# Patient Record
Sex: FEMALE | Race: WHITE | NOT HISPANIC OR LATINO
[De-identification: names, ages, dates, MRNs, and addresses within clinical notes are randomized per-mention and may not be internally consistent; named-entity substitution may affect disease eponyms.]

---

## 2017-01-13 ENCOUNTER — APPOINTMENT (OUTPATIENT)
Dept: NEUROSURGERY | Facility: CLINIC | Age: 37
End: 2017-01-13

## 2017-01-13 VITALS
OXYGEN SATURATION: 96 % | HEIGHT: 68 IN | WEIGHT: 117 LBS | SYSTOLIC BLOOD PRESSURE: 93 MMHG | HEART RATE: 83 BPM | BODY MASS INDEX: 17.73 KG/M2 | DIASTOLIC BLOOD PRESSURE: 60 MMHG

## 2017-01-26 ENCOUNTER — APPOINTMENT (OUTPATIENT)
Dept: NEUROSURGERY | Facility: CLINIC | Age: 37
End: 2017-01-26

## 2017-02-03 ENCOUNTER — APPOINTMENT (OUTPATIENT)
Dept: NEUROSURGERY | Facility: CLINIC | Age: 37
End: 2017-02-03

## 2017-02-03 VITALS
DIASTOLIC BLOOD PRESSURE: 65 MMHG | HEIGHT: 68 IN | HEART RATE: 57 BPM | OXYGEN SATURATION: 98 % | WEIGHT: 119 LBS | BODY MASS INDEX: 18.04 KG/M2 | SYSTOLIC BLOOD PRESSURE: 99 MMHG

## 2017-02-23 ENCOUNTER — FORM ENCOUNTER (OUTPATIENT)
Age: 37
End: 2017-02-23

## 2017-02-24 ENCOUNTER — OUTPATIENT (OUTPATIENT)
Dept: OUTPATIENT SERVICES | Facility: HOSPITAL | Age: 37
LOS: 1 days | End: 2017-02-24
Payer: COMMERCIAL

## 2017-02-24 ENCOUNTER — APPOINTMENT (OUTPATIENT)
Dept: OTOLARYNGOLOGY | Facility: CLINIC | Age: 37
End: 2017-02-24

## 2017-02-24 DIAGNOSIS — F50.9 EATING DISORDER, UNSPECIFIED: ICD-10-CM

## 2017-02-24 PROCEDURE — 70450 CT HEAD/BRAIN W/O DYE: CPT | Mod: 26

## 2017-02-24 PROCEDURE — 70450 CT HEAD/BRAIN W/O DYE: CPT

## 2017-02-25 PROBLEM — F50.9 EATING DISORDER IN REMISSION: Status: RESOLVED | Noted: 2017-02-25 | Resolved: 2017-02-25

## 2017-02-28 ENCOUNTER — APPOINTMENT (OUTPATIENT)
Dept: NEUROSURGERY | Facility: HOSPITAL | Age: 37
End: 2017-02-28

## 2017-04-03 ENCOUNTER — APPOINTMENT (OUTPATIENT)
Dept: OTOLARYNGOLOGY | Facility: CLINIC | Age: 37
End: 2017-04-03

## 2017-04-03 VITALS
HEART RATE: 79 BPM | HEIGHT: 68 IN | SYSTOLIC BLOOD PRESSURE: 97 MMHG | BODY MASS INDEX: 18.04 KG/M2 | DIASTOLIC BLOOD PRESSURE: 62 MMHG | WEIGHT: 119 LBS | TEMPERATURE: 97.4 F

## 2017-04-04 RX ORDER — CHLORHEXIDINE GLUCONATE 213 G/1000ML
1 SOLUTION TOPICAL ONCE
Qty: 0 | Refills: 0 | Status: COMPLETED | OUTPATIENT
Start: 2017-04-05 | End: 2017-04-06

## 2017-04-05 ENCOUNTER — INPATIENT (INPATIENT)
Facility: HOSPITAL | Age: 37
LOS: 4 days | Discharge: HOME HEALTH | DRG: 27 | End: 2017-04-10
Attending: RADIOLOGY | Admitting: RADIOLOGY
Payer: COMMERCIAL

## 2017-04-05 ENCOUNTER — RESULT REVIEW (OUTPATIENT)
Age: 37
End: 2017-04-05

## 2017-04-05 VITALS
SYSTOLIC BLOOD PRESSURE: 85 MMHG | OXYGEN SATURATION: 99 % | HEART RATE: 68 BPM | RESPIRATION RATE: 16 BRPM | DIASTOLIC BLOOD PRESSURE: 58 MMHG | TEMPERATURE: 99 F

## 2017-04-05 DIAGNOSIS — Z98.891 HISTORY OF UTERINE SCAR FROM PREVIOUS SURGERY: Chronic | ICD-10-CM

## 2017-04-05 DIAGNOSIS — D32.9 BENIGN NEOPLASM OF MENINGES, UNSPECIFIED: ICD-10-CM

## 2017-04-05 LAB
ALBUMIN SERPL ELPH-MCNC: 3.8 G/DL — SIGNIFICANT CHANGE UP (ref 3.4–5)
ALP SERPL-CCNC: 68 U/L — SIGNIFICANT CHANGE UP (ref 40–120)
ALT FLD-CCNC: 17 U/L — SIGNIFICANT CHANGE UP (ref 12–42)
ANION GAP SERPL CALC-SCNC: 5 MMOL/L — LOW (ref 9–16)
APTT BLD: 32.8 SEC — SIGNIFICANT CHANGE UP (ref 27.5–37.4)
AST SERPL-CCNC: 15 U/L — SIGNIFICANT CHANGE UP (ref 15–37)
BASOPHILS NFR BLD AUTO: 0.6 % — SIGNIFICANT CHANGE UP (ref 0–2)
BILIRUB SERPL-MCNC: 0.6 MG/DL — SIGNIFICANT CHANGE UP (ref 0.2–1.2)
BUN SERPL-MCNC: 9 MG/DL — SIGNIFICANT CHANGE UP (ref 7–23)
CALCIUM SERPL-MCNC: 9.1 MG/DL — SIGNIFICANT CHANGE UP (ref 8.5–10.5)
CHLORIDE SERPL-SCNC: 106 MMOL/L — SIGNIFICANT CHANGE UP (ref 96–108)
CO2 SERPL-SCNC: 28 MMOL/L — SIGNIFICANT CHANGE UP (ref 22–31)
CREAT SERPL-MCNC: 0.75 MG/DL — SIGNIFICANT CHANGE UP (ref 0.5–1.3)
EOSINOPHIL NFR BLD AUTO: 2.1 % — SIGNIFICANT CHANGE UP (ref 0–6)
GLUCOSE SERPL-MCNC: 85 MG/DL — SIGNIFICANT CHANGE UP (ref 70–99)
HCG SERPL-ACNC: <1 MIU/ML — SIGNIFICANT CHANGE UP
HCT VFR BLD CALC: 36.9 % — SIGNIFICANT CHANGE UP (ref 34.5–45)
HGB BLD-MCNC: 12.5 G/DL — SIGNIFICANT CHANGE UP (ref 11.5–15.5)
INR BLD: 1.09 — SIGNIFICANT CHANGE UP (ref 0.88–1.16)
LYMPHOCYTES # BLD AUTO: 39.5 % — SIGNIFICANT CHANGE UP (ref 13–44)
MCHC RBC-ENTMCNC: 28.3 PG — SIGNIFICANT CHANGE UP (ref 27–34)
MCHC RBC-ENTMCNC: 33.9 G/DL — SIGNIFICANT CHANGE UP (ref 32–36)
MCV RBC AUTO: 83.5 FL — SIGNIFICANT CHANGE UP (ref 80–100)
MONOCYTES NFR BLD AUTO: 10.5 % — SIGNIFICANT CHANGE UP (ref 2–14)
NEUTROPHILS NFR BLD AUTO: 47.3 % — SIGNIFICANT CHANGE UP (ref 43–77)
PLATELET # BLD AUTO: 152 K/UL — SIGNIFICANT CHANGE UP (ref 150–400)
POTASSIUM SERPL-MCNC: 4.2 MMOL/L — SIGNIFICANT CHANGE UP (ref 3.5–5.3)
POTASSIUM SERPL-SCNC: 4.2 MMOL/L — SIGNIFICANT CHANGE UP (ref 3.5–5.3)
PROT SERPL-MCNC: 7.2 G/DL — SIGNIFICANT CHANGE UP (ref 6.4–8.2)
PROTHROM AB SERPL-ACNC: 12.1 SEC — SIGNIFICANT CHANGE UP (ref 9.8–12.7)
RBC # BLD: 4.42 M/UL — SIGNIFICANT CHANGE UP (ref 3.8–5.2)
RBC # FLD: 13.9 % — SIGNIFICANT CHANGE UP (ref 10.3–16.9)
SODIUM SERPL-SCNC: 139 MMOL/L — SIGNIFICANT CHANGE UP (ref 135–145)
WBC # BLD: 4.8 K/UL — SIGNIFICANT CHANGE UP (ref 3.8–10.5)
WBC # FLD AUTO: 4.8 K/UL — SIGNIFICANT CHANGE UP (ref 3.8–10.5)

## 2017-04-05 PROCEDURE — 75898 FOLLOW-UP ANGIOGRAPHY: CPT | Mod: 26,59

## 2017-04-05 PROCEDURE — 93010 ELECTROCARDIOGRAM REPORT: CPT

## 2017-04-05 PROCEDURE — 61624 TCAT PERM OCCLS/EMBOLJ CNS: CPT

## 2017-04-05 PROCEDURE — 36227 PLACE CATH XTRNL CAROTID: CPT | Mod: 50

## 2017-04-05 PROCEDURE — 75894 X-RAYS TRANSCATH THERAPY: CPT | Mod: 26

## 2017-04-05 PROCEDURE — 99291 CRITICAL CARE FIRST HOUR: CPT | Mod: 24

## 2017-04-05 PROCEDURE — 36224 PLACE CATH CAROTD ART: CPT | Mod: 50

## 2017-04-05 RX ORDER — DEXAMETHASONE 0.5 MG/5ML
2 ELIXIR ORAL EVERY 8 HOURS
Qty: 0 | Refills: 0 | Status: DISCONTINUED | OUTPATIENT
Start: 2017-04-05 | End: 2017-04-07

## 2017-04-05 RX ORDER — GLUCAGON INJECTION, SOLUTION 0.5 MG/.1ML
1 INJECTION, SOLUTION SUBCUTANEOUS ONCE
Qty: 0 | Refills: 0 | Status: DISCONTINUED | OUTPATIENT
Start: 2017-04-05 | End: 2017-04-10

## 2017-04-05 RX ORDER — SODIUM CHLORIDE 9 MG/ML
1000 INJECTION, SOLUTION INTRAVENOUS
Qty: 0 | Refills: 0 | Status: DISCONTINUED | OUTPATIENT
Start: 2017-04-05 | End: 2017-04-10

## 2017-04-05 RX ORDER — ACETAMINOPHEN 500 MG
650 TABLET ORAL EVERY 6 HOURS
Qty: 0 | Refills: 0 | Status: DISCONTINUED | OUTPATIENT
Start: 2017-04-05 | End: 2017-04-10

## 2017-04-05 RX ORDER — SODIUM CHLORIDE 9 MG/ML
1000 INJECTION INTRAMUSCULAR; INTRAVENOUS; SUBCUTANEOUS
Qty: 0 | Refills: 0 | Status: DISCONTINUED | OUTPATIENT
Start: 2017-04-05 | End: 2017-04-07

## 2017-04-05 RX ORDER — PANTOPRAZOLE SODIUM 20 MG/1
40 TABLET, DELAYED RELEASE ORAL
Qty: 0 | Refills: 0 | Status: DISCONTINUED | OUTPATIENT
Start: 2017-04-05 | End: 2017-04-10

## 2017-04-05 RX ORDER — INSULIN LISPRO 100/ML
VIAL (ML) SUBCUTANEOUS
Qty: 0 | Refills: 0 | Status: DISCONTINUED | OUTPATIENT
Start: 2017-04-05 | End: 2017-04-10

## 2017-04-05 RX ORDER — DEXTROSE 50 % IN WATER 50 %
25 SYRINGE (ML) INTRAVENOUS ONCE
Qty: 0 | Refills: 0 | Status: DISCONTINUED | OUTPATIENT
Start: 2017-04-05 | End: 2017-04-10

## 2017-04-05 RX ORDER — SODIUM CHLORIDE 9 MG/ML
1000 INJECTION INTRAMUSCULAR; INTRAVENOUS; SUBCUTANEOUS ONCE
Qty: 0 | Refills: 0 | Status: COMPLETED | OUTPATIENT
Start: 2017-04-05 | End: 2017-04-05

## 2017-04-05 RX ORDER — DEXTROSE 50 % IN WATER 50 %
1 SYRINGE (ML) INTRAVENOUS ONCE
Qty: 0 | Refills: 0 | Status: DISCONTINUED | OUTPATIENT
Start: 2017-04-05 | End: 2017-04-10

## 2017-04-05 RX ORDER — DEXTROSE 50 % IN WATER 50 %
12.5 SYRINGE (ML) INTRAVENOUS ONCE
Qty: 0 | Refills: 0 | Status: DISCONTINUED | OUTPATIENT
Start: 2017-04-05 | End: 2017-04-10

## 2017-04-05 RX ADMIN — Medication 101 MILLIGRAM(S): at 22:01

## 2017-04-05 RX ADMIN — Medication 101 MILLIGRAM(S): at 18:01

## 2017-04-05 RX ADMIN — SODIUM CHLORIDE 100 MILLILITER(S): 9 INJECTION INTRAMUSCULAR; INTRAVENOUS; SUBCUTANEOUS at 10:38

## 2017-04-05 RX ADMIN — SODIUM CHLORIDE 2000 MILLILITER(S): 9 INJECTION INTRAMUSCULAR; INTRAVENOUS; SUBCUTANEOUS at 11:00

## 2017-04-05 NOTE — H&P ADULT - PROBLEM SELECTOR PLAN 1
Cerebral angiogram, possible tumor embolization today,  Admission to SDU vs ICU pending intervention during angiogram,  Pre-op for OR tomorrow  D/w Dr. Summers

## 2017-04-05 NOTE — CONSULT NOTE ADULT - SUBJECTIVE AND OBJECTIVE BOX
35 yo F h/o meningioma s/p angiogram awaiting resection    Meds: none  Allergies: No Known Allergies    Vitals: Vital Signs Last 24 Hrs  T(C): 36.4, Max: 37.1 (04-05 @ 10:30)  T(F): 97.6, Max: 98.8 (04-05 @ 10:30)  HR: 67 (60 - 73)  BP: 81/55 (80/57 - 99/63)  BP(mean): --  RR: 16 (15 - 16)  SpO2: 98% (95% - 100%)  CAPILLARY BLOOD GLUCOSE  143 (05 Apr 2017 16:00)      Labs:                         12.5   4.8   )-----------( 152      ( 05 Apr 2017 07:12 )             36.9   04-05    139  |  106  |  9   ----------------------------<  85  4.2   |  28  |  0.75    Ca    9.1      05 Apr 2017 07:12    TPro  7.2  /  Alb  3.8  /  TBili  0.6  /  DBili  x   /  AST  15  /  ALT  17  /  AlkPhos  68  04-05  PT/INR - ( 05 Apr 2017 07:12 )   PT: 12.1 sec;   INR: 1.09          PTT - ( 05 Apr 2017 07:12 )  PTT:32.8 sec    Exam:  Neuro: A&Ox3, NAD, grossly neuro intact  CV: RRR, no MRGs  Pulm: CTAB, no wheezes, rales mele  Abd: BS (+), Soft, NT, ND  Ext: No edema peripherally or centrally  Vasc: 2+ pulses - radial and PT

## 2017-04-05 NOTE — H&P ADULT - NSHPLABSRESULTS_GEN_ALL_CORE
CT Head: Frontal intraosseous mass lesion with   adjacent extra-axial calcification and osteolytic changes.

## 2017-04-05 NOTE — CONSULT NOTE ADULT - ASSESSMENT
37 yo F h/o meningioma s/p angiogram awaiting resection    Neuro: Q1h neuro checks, tylenol prn, decadron 2q8h  CV: HD stable, normotensive goals  Pulm:Satting well on facemask  GI/FEN: reg diet, npo after MN, NS@100, protonix  : Voiding  ID: none  Endo: ISS  Heme: SCDs  PPX: PPI  Lines: PIVs, Shayy

## 2017-04-05 NOTE — H&P ADULT - NSHPPHYSICALEXAM_GEN_ALL_CORE
Gen: NAD, AAOx3. WN/WD.  HEENT: PERRL. EOMI. NC/AT. VF grossly intact.  Neck: FROM, nontender.  Lungs: CTA b/l. No W/R/R  Heart: S1, S2. NSR.  Abd: Soft, flat, NT/ND. +BS  Exts; Pulses 2+ throughout  Neuro: CNs II-XII intact. 5/5 str x4 extremities. Sensation to LT intact. Following commands. Gait WNL. No focal neurological deficits.

## 2017-04-05 NOTE — BRIEF OPERATIVE NOTE - PROCEDURE
Cerebral angiography  04/05/2017  Cerebral Angiography with embolization of right frontal extra-axial brain mass  [facility]

## 2017-04-05 NOTE — H&P ADULT - HISTORY OF PRESENT ILLNESS
36 y.o female without significant PMHx,was found to have a frontal tumor by MRI after patient noticed a bony protuberance in the forehead.  No neurological symptoms, no pain.  Patient presents for pre opertaive cerebral angiogram and possible endovascular embolization prior to next day surgical resection.

## 2017-04-05 NOTE — BRIEF OPERATIVE NOTE - OPERATION/FINDINGS
Cerebral angiogram via right groin with 4FR sheath, manually compressed for 20 minutes. Embolization performed via bilateral middle meningeal arteries Cerebral angiogram via right CFA with 4FR sheath. Findings consistent with moderately vascular bifrontal parasagittal meningioma, supplied via bilateral middle meningeal arteries, left anterior falcine artery, and left superficial temporal artery. 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 Endovascular embolization performed via bilateral middle meningeal arteries, and left superficial temporal artery using embospheres. Significant devascularization noted post. Patient tolerated procedure well, no new neurological deficits. Hemodynamically stable. Puncture site manually compressed with hemostasis obtained without hematoma. Patient transferred to PACU pending SICU bed availability. Above d/w Dr. Summers.

## 2017-04-05 NOTE — H&P ADULT - ASSESSMENT
36 year old female with no significant PMH with frontal intraosseous mass likely meningioma here for cerebral angiography and possible tumor embolization with planned surgical intervention to follow.

## 2017-04-06 ENCOUNTER — APPOINTMENT (OUTPATIENT)
Dept: NEUROSURGERY | Facility: HOSPITAL | Age: 37
End: 2017-04-06

## 2017-04-06 LAB
ANION GAP SERPL CALC-SCNC: 12 MMOL/L — SIGNIFICANT CHANGE UP (ref 9–16)
ANION GAP SERPL CALC-SCNC: 9 MMOL/L — SIGNIFICANT CHANGE UP (ref 9–16)
APTT BLD: 27.1 SEC — LOW (ref 27.5–37.4)
BLD GP AB SCN SERPL QL: NEGATIVE — SIGNIFICANT CHANGE UP
BUN SERPL-MCNC: 7 MG/DL — SIGNIFICANT CHANGE UP (ref 7–23)
BUN SERPL-MCNC: 7 MG/DL — SIGNIFICANT CHANGE UP (ref 7–23)
CALCIUM SERPL-MCNC: 7.9 MG/DL — LOW (ref 8.5–10.5)
CALCIUM SERPL-MCNC: 8.6 MG/DL — SIGNIFICANT CHANGE UP (ref 8.5–10.5)
CHLORIDE SERPL-SCNC: 110 MMOL/L — HIGH (ref 96–108)
CHLORIDE SERPL-SCNC: 113 MMOL/L — HIGH (ref 96–108)
CO2 SERPL-SCNC: 20 MMOL/L — LOW (ref 22–31)
CO2 SERPL-SCNC: 23 MMOL/L — SIGNIFICANT CHANGE UP (ref 22–31)
CREAT SERPL-MCNC: 0.62 MG/DL — SIGNIFICANT CHANGE UP (ref 0.5–1.3)
CREAT SERPL-MCNC: 0.62 MG/DL — SIGNIFICANT CHANGE UP (ref 0.5–1.3)
GLUCOSE SERPL-MCNC: 132 MG/DL — HIGH (ref 70–99)
GLUCOSE SERPL-MCNC: 170 MG/DL — HIGH (ref 70–99)
HBA1C BLD-MCNC: 5.3 % — SIGNIFICANT CHANGE UP (ref 4.8–5.6)
HCT VFR BLD CALC: 31.2 % — LOW (ref 34.5–45)
HCT VFR BLD CALC: 35.5 % — SIGNIFICANT CHANGE UP (ref 34.5–45)
HGB BLD-MCNC: 10.6 G/DL — LOW (ref 11.5–15.5)
HGB BLD-MCNC: 11.9 G/DL — SIGNIFICANT CHANGE UP (ref 11.5–15.5)
INR BLD: 1.19 — HIGH (ref 0.88–1.16)
MAGNESIUM SERPL-MCNC: 1.7 MG/DL — SIGNIFICANT CHANGE UP (ref 1.6–2.4)
MAGNESIUM SERPL-MCNC: 2.1 MG/DL — SIGNIFICANT CHANGE UP (ref 1.6–2.4)
MCHC RBC-ENTMCNC: 28.1 PG — SIGNIFICANT CHANGE UP (ref 27–34)
MCHC RBC-ENTMCNC: 28.6 PG — SIGNIFICANT CHANGE UP (ref 27–34)
MCHC RBC-ENTMCNC: 33.5 G/DL — SIGNIFICANT CHANGE UP (ref 32–36)
MCHC RBC-ENTMCNC: 34 G/DL — SIGNIFICANT CHANGE UP (ref 32–36)
MCV RBC AUTO: 83.7 FL — SIGNIFICANT CHANGE UP (ref 80–100)
MCV RBC AUTO: 84.1 FL — SIGNIFICANT CHANGE UP (ref 80–100)
PHOSPHATE SERPL-MCNC: 3.4 MG/DL — SIGNIFICANT CHANGE UP (ref 2.5–4.5)
PHOSPHATE SERPL-MCNC: 4 MG/DL — SIGNIFICANT CHANGE UP (ref 2.5–4.5)
PLATELET # BLD AUTO: 177 K/UL — SIGNIFICANT CHANGE UP (ref 150–400)
PLATELET # BLD AUTO: 192 K/UL — SIGNIFICANT CHANGE UP (ref 150–400)
POTASSIUM SERPL-MCNC: 3.7 MMOL/L — SIGNIFICANT CHANGE UP (ref 3.5–5.3)
POTASSIUM SERPL-MCNC: 3.8 MMOL/L — SIGNIFICANT CHANGE UP (ref 3.5–5.3)
POTASSIUM SERPL-SCNC: 3.7 MMOL/L — SIGNIFICANT CHANGE UP (ref 3.5–5.3)
POTASSIUM SERPL-SCNC: 3.8 MMOL/L — SIGNIFICANT CHANGE UP (ref 3.5–5.3)
PROTHROM AB SERPL-ACNC: 13.2 SEC — HIGH (ref 9.8–12.7)
RBC # BLD: 3.71 M/UL — LOW (ref 3.8–5.2)
RBC # BLD: 4.24 M/UL — SIGNIFICANT CHANGE UP (ref 3.8–5.2)
RBC # FLD: 13.8 % — SIGNIFICANT CHANGE UP (ref 10.3–16.9)
RBC # FLD: 14.1 % — SIGNIFICANT CHANGE UP (ref 10.3–16.9)
RH IG SCN BLD-IMP: POSITIVE — SIGNIFICANT CHANGE UP
SODIUM SERPL-SCNC: 142 MMOL/L — SIGNIFICANT CHANGE UP (ref 135–145)
SODIUM SERPL-SCNC: 145 MMOL/L — SIGNIFICANT CHANGE UP (ref 135–145)
WBC # BLD: 10.1 K/UL — SIGNIFICANT CHANGE UP (ref 3.8–10.5)
WBC # BLD: 17.5 K/UL — HIGH (ref 3.8–10.5)
WBC # FLD AUTO: 10.1 K/UL — SIGNIFICANT CHANGE UP (ref 3.8–10.5)
WBC # FLD AUTO: 17.5 K/UL — HIGH (ref 3.8–10.5)

## 2017-04-06 PROCEDURE — 93010 ELECTROCARDIOGRAM REPORT: CPT

## 2017-04-06 PROCEDURE — 99291 CRITICAL CARE FIRST HOUR: CPT | Mod: 24

## 2017-04-06 PROCEDURE — 93970 EXTREMITY STUDY: CPT | Mod: 26

## 2017-04-06 PROCEDURE — 21179 RCNSTJ FOREHEAD WITH GRAFTS: CPT

## 2017-04-06 PROCEDURE — 62140 CRNOP SKULL DEFECT<5 CM DIAM: CPT | Mod: 62,59

## 2017-04-06 PROCEDURE — 61781 SCAN PROC CRANIAL INTRA: CPT

## 2017-04-06 PROCEDURE — 15275 SKIN SUB GRAFT FACE/NK/HF/G: CPT

## 2017-04-06 PROCEDURE — 61512 CRNEC TREPH EXC MNGIOMA STTL: CPT

## 2017-04-06 RX ORDER — ONDANSETRON 8 MG/1
4 TABLET, FILM COATED ORAL ONCE
Qty: 0 | Refills: 0 | Status: COMPLETED | OUTPATIENT
Start: 2017-04-06 | End: 2017-04-06

## 2017-04-06 RX ORDER — LEVETIRACETAM 250 MG/1
500 TABLET, FILM COATED ORAL
Qty: 0 | Refills: 0 | Status: DISCONTINUED | OUTPATIENT
Start: 2017-04-06 | End: 2017-04-10

## 2017-04-06 RX ORDER — CEFAZOLIN SODIUM 1 G
1000 VIAL (EA) INJECTION EVERY 8 HOURS
Qty: 0 | Refills: 0 | Status: COMPLETED | OUTPATIENT
Start: 2017-04-06 | End: 2017-04-07

## 2017-04-06 RX ORDER — ACETAMINOPHEN 500 MG
1000 TABLET ORAL ONCE
Qty: 0 | Refills: 0 | Status: DISCONTINUED | OUTPATIENT
Start: 2017-04-06 | End: 2017-04-10

## 2017-04-06 RX ORDER — FENTANYL CITRATE 50 UG/ML
25 INJECTION INTRAVENOUS ONCE
Qty: 0 | Refills: 0 | Status: DISCONTINUED | OUTPATIENT
Start: 2017-04-06 | End: 2017-04-06

## 2017-04-06 RX ADMIN — CHLORHEXIDINE GLUCONATE 1 APPLICATION(S): 213 SOLUTION TOPICAL at 07:18

## 2017-04-06 RX ADMIN — Medication 101 MILLIGRAM(S): at 06:54

## 2017-04-06 RX ADMIN — Medication 2: at 22:16

## 2017-04-06 RX ADMIN — Medication 100 MILLIGRAM(S): at 22:08

## 2017-04-06 RX ADMIN — ONDANSETRON 4 MILLIGRAM(S): 8 TABLET, FILM COATED ORAL at 15:42

## 2017-04-06 RX ADMIN — FENTANYL CITRATE 25 MICROGRAM(S): 50 INJECTION INTRAVENOUS at 19:46

## 2017-04-06 RX ADMIN — Medication: at 16:00

## 2017-04-06 RX ADMIN — FENTANYL CITRATE 25 MICROGRAM(S): 50 INJECTION INTRAVENOUS at 15:42

## 2017-04-06 RX ADMIN — PANTOPRAZOLE SODIUM 40 MILLIGRAM(S): 20 TABLET, DELAYED RELEASE ORAL at 06:54

## 2017-04-06 RX ADMIN — Medication 101 MILLIGRAM(S): at 16:00

## 2017-04-06 RX ADMIN — SODIUM CHLORIDE 100 MILLILITER(S): 9 INJECTION INTRAMUSCULAR; INTRAVENOUS; SUBCUTANEOUS at 00:06

## 2017-04-06 RX ADMIN — Medication 101 MILLIGRAM(S): at 22:08

## 2017-04-06 RX ADMIN — LEVETIRACETAM 500 MILLIGRAM(S): 250 TABLET, FILM COATED ORAL at 19:50

## 2017-04-06 RX ADMIN — Medication: at 06:53

## 2017-04-06 NOTE — CHART NOTE - NSCHARTNOTEFT_GEN_A_CORE
Upon Nutritional Assessment by the Registered Dietitian your patient was determined to meet criteria / has evidence of the following diagnosis/diagnoses:          [ ]  Mild Protein Calorie Malnutrition        [ ]  Moderate Protein Calorie Malnutrition        [ ] Severe Protein Calorie Malnutrition        [ ] Unspecified Protein Calorie Malnutrition        [X ] Underweight / BMI <19        [ ] Morbid Obesity / BMI > 40      Findings as based on:  •  Comprehensive nutrition assessment and consultation  •  Calorie counts (nutrient intake analysis)  •  Food acceptance and intake status from observations by staff  •  Follow up  •  Patient education  •  Intervention secondary to interdisciplinary rounds  •   concerns      Treatment:    The following diet has been recommended:      PROVIDER Section:     By signing this assessment you are acknowledging and agree with the diagnosis/diagnoses assigned by the Registered Dietitian    Comments:

## 2017-04-06 NOTE — PROGRESS NOTE ADULT - SUBJECTIVE AND OBJECTIVE BOX
=================================  NEUROCRITICAL CARE ATTENDING NOTE  =================================    JOSE J SOLORIO   MRN-[id number]  Summary:  36F with no PMH, noted bony protuberance in forehead.  MRI revealed a frontal tumor.  Admitted on  - angio / embo, and elective surgery planned for today .  Overnight Events: Admitted to NSICU overnight.    PAST MEDICAL & SURGICAL HISTORY: Meningioma: Newly diagnosed H/O:  section  Home meds: none    PHYSICAL EXAMINATION  T(C): , Max: 37.1 ( @ 10:30) HR: 76 (58 - 94) BP: 90/54 (80/57 - 100/59) RR: 16 (13 - 21) SpO2: 97% (95% - 100%)  NEUROLOGIC EXAMINATION:  Patient is awake, alert, fully oriented, pupils 3mm equal and briskly reactive to light, EOMs intact, muscle strength 5/5 on all 4 extremities  GENERAL:  not intubated, not in cardiorespiratory distress  EENT: anicteric  CARDIOVASC:  (+) S1 S2, normal rate and regular rhythm  PULMONARY:  clear to auscultation bilaterally  ABDOMEN:  soft, nontender, with normoactive bowel sounds  EXTREMITIES:  no edema, no groin hematoma, strong distal pulses  SKIN:  no rash    LABS:  CAPILLARY BLOOD GLUCOSE 118 (2017 07:00) 143 (2017 16:00) 96 (2017 11:30)                        11.9   10.1  )-----------( 177      ( 2017 06:21 )             35.5     142  |  110<H>  |  7   ----------------------------<  132<H>  3.7   |  23  |  0.62    Ca    8.6      2017 06:21  Phos  4.0     -  Mg     2.1     -    TPro  7.2  /  Alb  3.8  /  TBili  0.6  /  DBili  x   /  AST  15  /  ALT  17  /  AlkPhos  68  -05    Neuroimagin17 CT head Frontal intraosseous mass lesion with adjacent extra-axial calcification and osteolytic changes  Other imaging:    MEDICATIONS: dexamethasone 2mg IV q8h pantoprazole 40 PO daily mod ISS     IV FLUIDS:  DRIPS:  DIET:  Lines / Drains:    CODE STATUS:  full code                       GOALS OF CARE:  aggressive                      DISPOSITION:  ICU =================================  NEUROCRITICAL CARE ATTENDING NOTE  =================================    JOSE J SOLORIO   [id number]  Summary:  36F with no PMH, noted bony protuberance in forehead.  MRI revealed a frontal tumor.  Admitted on  - angio / embo, and elective surgery planned for today .  Overnight Events: Admitted to NSICU overnight, no events overnight    PAST MEDICAL & SURGICAL HISTORY: Meningioma: Newly diagnosed H/O:  section  Home meds: none    PHYSICAL EXAMINATION  T(C): , Max: 37.1 ( @ 10:30) HR: 76 (58 - 94) BP: 90/54 (80/57 - 100/59) RR: 16 (13 - 21) SpO2: 97% (95% - 100%)  NEUROLOGIC EXAMINATION:  Patient is awake, alert, fully oriented, pupils 3mm equal and briskly reactive to light, EOMs intact, muscle strength 5/5 on all 4 extremities  GENERAL:  not intubated, not in cardiorespiratory distress  EENT: anicteric  CARDIOVASC:  (+) S1 S2, normal rate and regular rhythm  PULMONARY:  clear to auscultation bilaterally  ABDOMEN:  soft, nontender, with normoactive bowel sounds  EXTREMITIES:  no edema, no groin hematoma, strong distal pulses  SKIN:  no rash    LABS:  CAPILLARY BLOOD GLUCOSE 118 (2017 07:00) 143 (2017 16:00) 96 (2017 11:30)                        11.9   10.1  )-----------( 177      ( 2017 06:21 )             35.5     142  |  110<H>  |  7   ----------------------------<  132<H>  3.7   |  23  |  0.62    Ca    8.6      2017 06:21  Phos  4.0     04-  Mg     2.1     -    INR 1.19    TPro  7.2  /  Alb  3.8  /  TBili  0.6  /  DBili  x   /  AST  15  /  ALT  17  /  AlkPhos  68  -    Neuroimagin17 CT head Frontal intraosseous mass lesion with adjacent extra-axial calcification and osteolytic changes  Other imaging:    MEDICATIONS: dexamethasone 2mg IV q8h pantoprazole 40 PO daily mod ISS    IV FLUIDS: NS@100cc/hr  DRIPS:  DIET: NPO  Lines / Drains: [place], ?Lisa    CODE STATUS:  full code                       GOALS OF CARE:  aggressive                      DISPOSITION:  ICU

## 2017-04-06 NOTE — PROGRESS NOTE ADULT - SUBJECTIVE AND OBJECTIVE BOX
HPI:  36 y.o female without significant PMHx,was found to have a frontal tumor by MRI after patient noticed a bony protuberance in the forehead.  No neurological symptoms, no pain.  Patient presents for pre opertaive cerebral angiogram and possible endovascular embolization prior to next day surgical resection. (05 Apr 2017 08:18)    admitted to ICU post procedure yesterday; embolization for resection of menigioma today  no acute events overnight  patient in OR currently but denies any complaints this morning    OVERNIGHT EVENTS:  Vital Signs Last 24 Hrs  T(C): 36.8, Max: 37.1 (04-05 @ 10:30)  T(F): 98.2, Max: 98.8 (04-05 @ 10:30)  HR: 76 (58 - 94)  BP: 90/54 (80/57 - 100/59)  BP(mean): 68 (65 - 71)  RR: 16 (13 - 21)  SpO2: 97% (95% - 100%)    I&O's Detail    I & Os for current day (as of 06 Apr 2017 08:12)  =============================================  IN:    sodium chloride 0.9%.: 1600 ml    Sodium Chloride 0.9% IV Bolus: 1000 ml    Oral Fluid: 900 ml    IV PiggyBack: 50 ml    Total IN: 3550 ml  ---------------------------------------------  OUT:    Voided: 4900 ml    Total OUT: 4900 ml  ---------------------------------------------  Total NET: -1350 ml    I&O's Summary    I & Os for current day (as of 06 Apr 2017 08:12)  =============================================  IN: 3550 ml / OUT: 4900 ml / NET: -1350 ml      PHYSICAL EXAM:  Neurological:  A&O X 3, comfortable  EOMI, PERRL  face symmetric  neg drift  TARANGO x 4, motor 5/5 throughout  groin site c/d/i  distal pulses intact, w/w/p    TUBES/LINES:  pivs    DIET:  [x] NPO  [] Mechanical  [] Tube feeds    LABS:                        11.9   10.1  )-----------( 177      ( 06 Apr 2017 06:21 )             35.5     04-06    142  |  110<H>  |  7   ----------------------------<  132<H>  3.7   |  23  |  0.62    Ca    8.6      06 Apr 2017 06:21  Phos  4.0     04-06  Mg     2.1     04-06    TPro  7.2  /  Alb  3.8  /  TBili  0.6  /  DBili  x   /  AST  15  /  ALT  17  /  AlkPhos  68  04-05    PT/INR - ( 06 Apr 2017 06:21 )   PT: 13.2 sec;   INR: 1.19          PTT - ( 06 Apr 2017 06:21 )  PTT:27.1 sec        CAPILLARY BLOOD GLUCOSE  118 (06 Apr 2017 07:00)  143 (05 Apr 2017 16:00)  96 (05 Apr 2017 11:30)      Drug Levels: [] N/A    CSF Analysis: [] N/A      Allergies    No Known Allergies    Intolerances      MEDICATIONS:  Antibiotics:    Neuro:  acetaminophen   Tablet. 650milliGRAM(s) Oral every 6 hours PRN    Anticoagulation:    OTHER:  dexamethasone  IVPB 2milliGRAM(s) IV Intermittent every 8 hours  pantoprazole    Tablet 40milliGRAM(s) Oral before breakfast  insulin lispro (HumaLOG) corrective regimen sliding scale  SubCutaneous three times a day before meals  dextrose Gel 1Dose(s) Oral once PRN  dextrose 50% Injectable 12.5Gram(s) IV Push once  dextrose 50% Injectable 25Gram(s) IV Push once  dextrose 50% Injectable 25Gram(s) IV Push once  glucagon  Injectable 1milliGRAM(s) IntraMuscular once PRN    IVF:  sodium chloride 0.9%. 1000milliLiter(s) IV Continuous <Continuous>  dextrose 5%. 1000milliLiter(s) IV Continuous <Continuous>    CULTURES:    RADIOLOGY & ADDITIONAL TESTS:       ASSESSMENT:  36y Female s/p embolization procedure yesterday of brain mass currently in OR for craniotomy and resection of midline frontal meningioma preop intact    PLAN:  NEURO:  q1h neuro checks  pain control  keppra ppx  decadron per surgeon  HOB 30 deg    CARDIOVASCULAR:  monitor HD status closely  BP control    PULMONARY:  enc IS use post op    RENAL:  f/u post op labs  cont NS IVF hydration    GI:  NPO and will adat  PPI  bowel regimen    HEME:  trend h/h    ID:  post op abx    ENDO:  ISS while on decadron    DVT PROPHYLAXIS:  [x] Venodynes                                [] Heparin/Lovenox    DISPOSITION:   sicu status  full code  pt/ot pending  dw Dr. Yarbrough and Dr. Davis

## 2017-04-06 NOTE — PROGRESS NOTE ADULT - ATTENDING COMMENTS
PLAN:   NEURO: neurochecks q1h, PRN pain meds with Tylenol,  no opiates  Frontal ossous mass:  s/p angio embo - to OR for resection today, f/u pathology report  seizure prophylaxis:  levetiracetam 500mg IV BID x 5 days (stop date 04/10/17)  REHAB:  physical therapy evaluation and management    EARLY MOB:  HOB up    PULM:  Room air, incentive spirometry  CARDIO:  SBP goal 100-150mm Hg  ENDO:  Blood sugar goals 140-180 mg/dL, continue insulin sliding scale  GI:  docusate senna  DIET: NPO for OR  RENAL:  IVF while nPO NS@75cc/hr  HEM/ONC: check post-op Hb  VTE Prophylaxis:  SCDs, baseline dopplers for DVT suspected on admission (?malignancy), no DVT chemoprophylaxis as patient is going to OR today  ID: afebrile, no leukocytosis, perioperative ancef then d/c  Social:  at bedside, updated yesterday    Patient at high risk for neurological deterioration or death due to:  seizure, intracranial bleed, aspiration.  Critical care time, excluding procedures: 60 minutes. PLAN:   NEURO: neurochecks q1h, PRN pain meds with Tylenol,  no opiates  Frontal ossous mass:  s/p angio embo - to OR for resection today, f/u pathology report  ?indication for decadron - continue for now will inquire with neurosurgery  seizure prophylaxis:  levetiracetam 500mg IV BID x 5 days (stop date 04/10/17)  REHAB:  physical therapy evaluation and management    EARLY MOB:  HOB up    PULM:  Room air, incentive spirometry  CARDIO:  SBP goal 100-150mm Hg  ENDO:  Blood sugar goals 140-180 mg/dL, continue insulin sliding scale  GI:  docusate senna  DIET: NPO for OR  RENAL:  NS@100cc/hr  HEM/ONC: check post-op Hb  VTE Prophylaxis:  SCDs, baseline dopplers for DVT suspected on admission (?malignancy), no DVT chemoprophylaxis as patient is going to OR today  ID: afebrile, no leukocytosis, perioperative ancef then d/c  Social:  at bedside, updated yesterday    Patient at high risk for neurological deterioration or death due to:  seizure, intracranial bleed, aspiration.  Critical care time, excluding procedures: 60 minutes.

## 2017-04-06 NOTE — DIETITIAN INITIAL EVALUATION ADULT. - OTHER INFO
37y/o F with h/o meningioma s/p angiogram and for resection. NPO ordered and pt now off floor for the OR. Unable to obtain prior diet/wt history. Per BMI of 17.9, pt is underweight; see chart note in EMR. When pt is extubated, consult SLP for S&S prior to diet advancement. If pt to remains intubated >24hr or is not deemed safe for po, recommend EN initiation as medically feasible. Will follow.

## 2017-04-06 NOTE — PROGRESS NOTE ADULT - SUBJECTIVE AND OBJECTIVE BOX
HPI:  36 y.o female without significant PMHx,was found to have a frontal tumor by MRI after patient noticed a bony protuberance in the forehead.  No neurological symptoms, no pain.  Patient presents for pre operative cerebral angiogram and possible endovascular embolization prior to next day surgical resection. (05 Apr 2017 08:18)    admitted to ICU post op  reporting nausea and incisional pain  given zofran and fentanyl  denies any vomiting, blurry vision or neurologic complaints    OVERNIGHT EVENTS:  Vital Signs Last 24 Hrs  T(C): 35.6, Max: 37.1 (04-05 @ 20:15)  T(F): 96, Max: 98.7 (04-05 @ 20:15)  HR: 74 (58 - 94)  BP: 105/61 (81/55 - 105/61)  BP(mean): 75 (65 - 75)  RR: 22 (13 - 22)  SpO2: 100% (96% - 100%)    I&O's Summary    I & Os for current day (as of 06 Apr 2017 16:15)  =============================================  IN: 3550 ml / OUT: 4900 ml / NET: -1350 ml      PHYSICAL EXAM:  Neurological:  A&O X 3, mild incisional pain  JESSICA drain intact  EOMI, PERRL  neg drift  TARANGO x 4 motor 5/5 throughout  incision site c/d/i  sensation intact b/l    TUBES/LINES:  [x] Gonzalez  [] Lumbar Drain  [] Wound Drains  [] Others: JESSICA subgaleal      DIET:  [x] NPO  [] Mechanical  [] Tube feeds    LABS:                        11.9   10.1  )-----------( 177      ( 06 Apr 2017 06:21 )             35.5     04-06    142  |  110<H>  |  7   ----------------------------<  132<H>  3.7   |  23  |  0.62    Ca    8.6      06 Apr 2017 06:21  Phos  4.0     04-06  Mg     2.1     04-06    TPro  7.2  /  Alb  3.8  /  TBili  0.6  /  DBili  x   /  AST  15  /  ALT  17  /  AlkPhos  68  04-05    PT/INR - ( 06 Apr 2017 06:21 )   PT: 13.2 sec;   INR: 1.19          PTT - ( 06 Apr 2017 06:21 )  PTT:27.1 sec        CAPILLARY BLOOD GLUCOSE  118 (06 Apr 2017 07:00)      Drug Levels: [] N/A    CSF Analysis: [] N/A      Allergies    No Known Allergies    Intolerances      MEDICATIONS:  Antibiotics:  ceFAZolin   IVPB 1000milliGRAM(s) IV Intermittent every 8 hours    Neuro:  acetaminophen   Tablet. 650milliGRAM(s) Oral every 6 hours PRN  levETIRAcetam 500milliGRAM(s) Oral two times a day    Anticoagulation:    OTHER:  dexamethasone  IVPB 2milliGRAM(s) IV Intermittent every 8 hours  pantoprazole    Tablet 40milliGRAM(s) Oral before breakfast  insulin lispro (HumaLOG) corrective regimen sliding scale  SubCutaneous Before meals and at bedtime  dextrose Gel 1Dose(s) Oral once PRN  dextrose 50% Injectable 12.5Gram(s) IV Push once  dextrose 50% Injectable 25Gram(s) IV Push once  dextrose 50% Injectable 25Gram(s) IV Push once  glucagon  Injectable 1milliGRAM(s) IntraMuscular once PRN    IVF:  sodium chloride 0.9%. 1000milliLiter(s) IV Continuous <Continuous>  dextrose 5%. 1000milliLiter(s) IV Continuous <Continuous>    CULTURES:    RADIOLOGY & ADDITIONAL TESTS:      ASSESSMENT:  36y Female s/p craniectomy and resection of tumor with cranioplasty POD 0 neurologically intact    PLAN:  NEURO:  q1h neuro checks  pain control  hob 30 deg  MRI in 48 hrs  keppra ppx x 5 days  trend JESSICA output    CARDIOVASCULAR:  SBP < 180    PULMONARY:  wean NC  enc IS use    RENAL:  NS hydration while NPO    GI:  adat  zofran prn  ppi  bowel regimen    HEME:  trend h/h post op    ID:  post op ancef    ENDO:  ISS    DVT PROPHYLAXIS:  [x] Venodynes                                [] Heparin/Lovenox    DISPOSITION:  icu status  full code  dispo pending  d/w Dr. Yarbrough and Dr. Davis

## 2017-04-06 NOTE — PROGRESS NOTE ADULT - ASSESSMENT
36F with frontal intraosseous mass lesion, s/p angio and endovascular embolization (04/05/17, Dr. Summers)

## 2017-04-06 NOTE — PROGRESS NOTE ADULT - SUBJECTIVE AND OBJECTIVE BOX
Pre-Op note  Dx: skull tumor  Sx: craniectomy skull tumor resection  Surgeon: Dr. Yarbrough    Labs:                        12.5   4.8   )-----------( 152      ( 05 Apr 2017 07:12 )             36.9   04-05    139  |  106  |  9   ----------------------------<  85  4.2   |  28  |  0.75    Ca    9.1      05 Apr 2017 07:12    TPro  7.2  /  Alb  3.8  /  TBili  0.6  /  DBili  x   /  AST  15  /  ALT  17  /  AlkPhos  68  04-05  PT/INR - ( 05 Apr 2017 07:12 )   PT: 12.1 sec;   INR: 1.09          PTT - ( 05 Apr 2017 07:12 )  PTT:32.8 sec      - Medical clearance is in the chart  - Consent signed by the pt, in the front of the chart  - NPO, IVF  - T&S ordered, 2 u pRBC on hold for OR  - D/w Dr. Yarbrough

## 2017-04-06 NOTE — DIETITIAN INITIAL EVALUATION ADULT. - ENERGY NEEDS
IBW 63.6Kg  %IBW 84%  BMI 17.9    Utilized ABW to calculate needs, pt falls within % of IBW. Increased needs for pre-op and underweight.

## 2017-04-06 NOTE — BRIEF OPERATIVE NOTE - OPERATION/FINDINGS
PROCEDURE  Bifrontal craniectomy, resection of bifrontal extra-axial lesion with transosseous invasion, duraplasty (Alloderm) and cranioplasty (custom-made PEEK implant)    Calcified and partly fibrous extra-axial dural and SSS-based lesion, some areas of likely pial-cortical invasion in L medial frontal lobe, significant hyperostosis of cranium, no scalp invasion  Frozen section: probable meningioma  No complications

## 2017-04-06 NOTE — PRE-OP CHECKLIST - SELECT TESTS ORDERED
Results in MD note/Type and Cross/BMP/PT/PTT/CBC/INR/CMP Type and Cross/CBC/PT/PTT/hcg 4/5/17 negative/Results in MD note/CMP/INR/BMP

## 2017-04-07 LAB
ANION GAP SERPL CALC-SCNC: 7 MMOL/L — LOW (ref 9–16)
BUN SERPL-MCNC: 5 MG/DL — LOW (ref 7–23)
CALCIUM SERPL-MCNC: 7.8 MG/DL — LOW (ref 8.5–10.5)
CHLORIDE SERPL-SCNC: 108 MMOL/L — SIGNIFICANT CHANGE UP (ref 96–108)
CO2 SERPL-SCNC: 27 MMOL/L — SIGNIFICANT CHANGE UP (ref 22–31)
CREAT SERPL-MCNC: 0.64 MG/DL — SIGNIFICANT CHANGE UP (ref 0.5–1.3)
GLUCOSE SERPL-MCNC: 126 MG/DL — HIGH (ref 70–99)
HCT VFR BLD CALC: 30.1 % — LOW (ref 34.5–45)
HGB BLD-MCNC: 9.9 G/DL — LOW (ref 11.5–15.5)
MAGNESIUM SERPL-MCNC: 2 MG/DL — SIGNIFICANT CHANGE UP (ref 1.6–2.4)
MCHC RBC-ENTMCNC: 28 PG — SIGNIFICANT CHANGE UP (ref 27–34)
MCHC RBC-ENTMCNC: 32.9 G/DL — SIGNIFICANT CHANGE UP (ref 32–36)
MCV RBC AUTO: 85.3 FL — SIGNIFICANT CHANGE UP (ref 80–100)
PHOSPHATE SERPL-MCNC: 4.3 MG/DL — SIGNIFICANT CHANGE UP (ref 2.5–4.5)
PLATELET # BLD AUTO: 177 K/UL — SIGNIFICANT CHANGE UP (ref 150–400)
POTASSIUM SERPL-MCNC: 3.9 MMOL/L — SIGNIFICANT CHANGE UP (ref 3.5–5.3)
POTASSIUM SERPL-SCNC: 3.9 MMOL/L — SIGNIFICANT CHANGE UP (ref 3.5–5.3)
RBC # BLD: 3.53 M/UL — LOW (ref 3.8–5.2)
RBC # FLD: 14.3 % — SIGNIFICANT CHANGE UP (ref 10.3–16.9)
SODIUM SERPL-SCNC: 142 MMOL/L — SIGNIFICANT CHANGE UP (ref 135–145)
WBC # BLD: 9.7 K/UL — SIGNIFICANT CHANGE UP (ref 3.8–10.5)
WBC # FLD AUTO: 9.7 K/UL — SIGNIFICANT CHANGE UP (ref 3.8–10.5)

## 2017-04-07 PROCEDURE — 99291 CRITICAL CARE FIRST HOUR: CPT | Mod: 24

## 2017-04-07 RX ORDER — DEXAMETHASONE 0.5 MG/5ML
2 ELIXIR ORAL EVERY 12 HOURS
Qty: 0 | Refills: 0 | Status: DISCONTINUED | OUTPATIENT
Start: 2017-04-07 | End: 2017-04-10

## 2017-04-07 RX ORDER — SENNA PLUS 8.6 MG/1
2 TABLET ORAL AT BEDTIME
Qty: 0 | Refills: 0 | Status: DISCONTINUED | OUTPATIENT
Start: 2017-04-07 | End: 2017-04-10

## 2017-04-07 RX ORDER — TRAMADOL HYDROCHLORIDE 50 MG/1
25 TABLET ORAL EVERY 6 HOURS
Qty: 0 | Refills: 0 | Status: DISCONTINUED | OUTPATIENT
Start: 2017-04-07 | End: 2017-04-10

## 2017-04-07 RX ORDER — DOCUSATE SODIUM 100 MG
100 CAPSULE ORAL THREE TIMES A DAY
Qty: 0 | Refills: 0 | Status: DISCONTINUED | OUTPATIENT
Start: 2017-04-07 | End: 2017-04-10

## 2017-04-07 RX ORDER — ENOXAPARIN SODIUM 100 MG/ML
40 INJECTION SUBCUTANEOUS AT BEDTIME
Qty: 0 | Refills: 0 | Status: DISCONTINUED | OUTPATIENT
Start: 2017-04-07 | End: 2017-04-10

## 2017-04-07 RX ADMIN — Medication: at 13:41

## 2017-04-07 RX ADMIN — Medication 650 MILLIGRAM(S): at 07:10

## 2017-04-07 RX ADMIN — Medication 2 MILLIGRAM(S): at 17:31

## 2017-04-07 RX ADMIN — TRAMADOL HYDROCHLORIDE 25 MILLIGRAM(S): 50 TABLET ORAL at 12:29

## 2017-04-07 RX ADMIN — Medication 650 MILLIGRAM(S): at 16:30

## 2017-04-07 RX ADMIN — Medication 100 MILLIGRAM(S): at 07:08

## 2017-04-07 RX ADMIN — Medication 650 MILLIGRAM(S): at 15:11

## 2017-04-07 RX ADMIN — PANTOPRAZOLE SODIUM 40 MILLIGRAM(S): 20 TABLET, DELAYED RELEASE ORAL at 07:09

## 2017-04-07 RX ADMIN — SENNA PLUS 2 TABLET(S): 8.6 TABLET ORAL at 23:00

## 2017-04-07 RX ADMIN — ENOXAPARIN SODIUM 40 MILLIGRAM(S): 100 INJECTION SUBCUTANEOUS at 23:00

## 2017-04-07 RX ADMIN — TRAMADOL HYDROCHLORIDE 25 MILLIGRAM(S): 50 TABLET ORAL at 20:57

## 2017-04-07 RX ADMIN — LEVETIRACETAM 500 MILLIGRAM(S): 250 TABLET, FILM COATED ORAL at 17:32

## 2017-04-07 RX ADMIN — Medication 650 MILLIGRAM(S): at 07:39

## 2017-04-07 RX ADMIN — TRAMADOL HYDROCHLORIDE 25 MILLIGRAM(S): 50 TABLET ORAL at 21:30

## 2017-04-07 RX ADMIN — Medication 101 MILLIGRAM(S): at 07:08

## 2017-04-07 RX ADMIN — LEVETIRACETAM 500 MILLIGRAM(S): 250 TABLET, FILM COATED ORAL at 07:09

## 2017-04-07 RX ADMIN — Medication 100 MILLIGRAM(S): at 16:17

## 2017-04-07 RX ADMIN — Medication 100 MILLIGRAM(S): at 23:00

## 2017-04-07 NOTE — PHYSICAL THERAPY INITIAL EVALUATION ADULT - DIAGNOSIS, PT EVAL
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

## 2017-04-07 NOTE — PROGRESS NOTE ADULT - ASSESSMENT
36y Female s/p bifrontal craniectomy for tumor resection and cranioplasty  -neuro checks  -pain control  -continue keppra 500 BID  -continue decadron 2q8  -monitor JESSICA output  - scds  - care per NSICU

## 2017-04-07 NOTE — OCCUPATIONAL THERAPY INITIAL EVALUATION ADULT - GENERAL OBSERVATIONS, REHAB EVAL
Right hand dominant. Patient cleared for Occupational Therapy by neurosurgery NP Nuha and HOSSEIN Mar. Patient received supine in non-acute distress, +EKG, +IV heplock, +head dressing C/D/I, +JESSICA drain from right side of head. Patient denies pain, reports feeling occasionally "woozy" with activity. RN aware.

## 2017-04-07 NOTE — PROGRESS NOTE ADULT - SUBJECTIVE AND OBJECTIVE BOX
ENT [facility] DAILY PROGRESS NOTE    HPI: 36F s/p resection of likely frontal meningioma with mesh cranioplasty    Interval: NAEO. minor pain at site. some leakage from drain.         Allergies    No Known Allergies    Intolerances        MEDICATIONS:  Antiinfectives:     IV fluids:  dextrose 5%. 1000milliLiter(s) IV Continuous <Continuous>    Hematologic/Anticoagulation:  enoxaparin Injectable 40milliGRAM(s) SubCutaneous at bedtime    Pain medications/Neuro:  acetaminophen   Tablet. 650milliGRAM(s) Oral every 6 hours PRN  levETIRAcetam 500milliGRAM(s) Oral two times a day  acetaminophen  IVPB. 1000milliGRAM(s) IV Intermittent once  traMADol 25milliGRAM(s) Oral every 6 hours PRN    Endocrine Medications:   insulin lispro (HumaLOG) corrective regimen sliding scale  SubCutaneous Before meals and at bedtime  dextrose Gel 1Dose(s) Oral once PRN  dextrose 50% Injectable 12.5Gram(s) IV Push once  dextrose 50% Injectable 25Gram(s) IV Push once  dextrose 50% Injectable 25Gram(s) IV Push once  glucagon  Injectable 1milliGRAM(s) IntraMuscular once PRN  dexamethasone     Tablet 2milliGRAM(s) Oral every 12 hours    All other standing medications:   pantoprazole    Tablet 40milliGRAM(s) Oral before breakfast  docusate sodium 100milliGRAM(s) Oral three times a day  senna 2Tablet(s) Oral at bedtime    All other PRN medications:      Vital Signs Last 24 Hrs  T(C): 36.7, Max: 37.1 (04-06 @ 22:13)  T(F): 98.1, Max: 98.7 (04-06 @ 22:13)  HR: 72 (54 - 88)  BP: 85/65 (80/54 - 109/67)  BP(mean): 62 (62 - 84)  RR: 12 (12 - 26)  SpO2: 100% (97% - 100%)    I & Os for 24h ending 04-07 @ 07:00  =============================================  IN:    Oral Fluid: 1150 ml    sodium chloride 0.9%: 825 ml    IV PiggyBack: 150 ml    Total IN: 2125 ml  ---------------------------------------------  OUT:    Indwelling Catheter - Urethral: 2490 ml    Voided: 1275 ml    Accordian: 155 ml    Total OUT: 3920 ml  ---------------------------------------------  Total NET: -1795 ml    I & Os for current day (as of 04-07 @ 11:42)  =============================================  IN:    Total IN: 0 ml  ---------------------------------------------  OUT:    Total OUT: 0 ml  ---------------------------------------------  Total NET: 0 ml        PHYSICAL EXAM:    ENT EXAM-   NAD. comfortable  no increased WOB. no stridor  bicoronal incision intact. staples in place. no dehiscence or erythema  JESSICA drain in place with serosanguinous fluid  head wrap replace.d     LABS:  CBC-                        9.9    9.7   )-----------( 177      ( 07 Apr 2017 04:59 )             30.1     BMP/CMP-  07 Apr 2017 04:58    142    |  108    |  5      ----------------------------<  126    3.9     |  27     |  0.64     Ca    7.8        07 Apr 2017 04:58  Phos  4.3       07 Apr 2017 04:58  Mg     2.0       07 Apr 2017 04:58      Coagulation Studies-  PT/INR - ( 06 Apr 2017 06:21 )   PT: 13.2 sec;   INR: 1.19          PTT - ( 06 Apr 2017 06:21 )  PTT:27.1 sec  Endocrine Panel-  Calcium, Total Serum: 7.8 mg/dL (04-07 @ 04:58)  Calcium, Total Serum: 7.9 mg/dL (04-06 @ 16:29)

## 2017-04-07 NOTE — PHYSICAL THERAPY INITIAL EVALUATION ADULT - GENERAL OBSERVATIONS, REHAB EVAL
Patient received semi-supine in bed in no observed distress, +EKG, +IV heplock, +JESSICA, +head dressing, CDI.

## 2017-04-07 NOTE — PROGRESS NOTE ADULT - SUBJECTIVE AND OBJECTIVE BOX
=================================  NEUROCRITICAL CARE ATTENDING NOTE  =================================    JOSE J SOLORIO   MRN-[id number]  Summary:  36F with no PMH, noted bony protuberance in forehead.  MRI revealed a frontal tumor.  Admitted on  - angio / embo, and elective surgery planned for today .  Overnight Events: Admitted to NSICU overnight, hypotensive episode this morinng to SBP 79s after percocet given, given a fluid bolus 1 L    PAST MEDICAL & SURGICAL HISTORY: Meningioma: Newly diagnosed H/O:  section  Home meds: none    PHYSICAL EXAMINATION  T(C): , Max: 37.1 ( @ 22:13) HR: 72 (66 - 88) BP: 93/70 (80/54 - 109/67) RR: 15 (12 - 26) SpO2: 99% (97% - 100%)  NEUROLOGIC EXAMINATION:  Patient is awake, alert, fully oriented, pupils 3mm equal and briskly reactive to light, EOMs intact, muscle strength 5/5 on all 4 extremities  GENERAL:  not intubated, not in cardiorespiratory distress  EENT: anicteric  CARDIOVASC:  (+) S1 S2, normal rate and regular rhythm  PULMONARY:  clear to auscultation bilaterally  ABDOMEN:  soft, nontender, with normoactive bowel sounds  EXTREMITIES:  no edema, no groin hematoma, strong distal pulses  SKIN:  no rash    LABS:  CAPILLARY BLOOD GLUCOSE 124 (2017 07:30) 181 (2017 21:48)                        9.9    9.7   )-----------( 177      ( 2017 04:59 )             30.1     142  |  108  |  5<L>  ----------------------------<  126<H>  3.9   |  27  |  0.64    Ca    7.8<L>      2017 04:58  Phos  4.3       Mg     2.0         I & Os for 24h ending  @ 07:00  IN: 2125 ml / OUT: 3920 ml / NET: -1795 ml    Neuroimagin17 CT head Frontal intraosseous mass lesion with adjacent extra-axial calcification and osteolytic changes  Other imaging:    MEDICATIONS: dexamethasone 2mg IV q8h pantoprazole 40 PO daily mod ISS    IV FLUIDS: hep lock  DRIPS:  DIET: regular  Lines / Drains: SG JESSICA (150cc/24h) - as per ENT    CODE STATUS:  full code                       GOALS OF CARE:  aggressive                      DISPOSITION:  ICU

## 2017-04-07 NOTE — PHYSICAL THERAPY INITIAL EVALUATION ADULT - MODALITIES TREATMENT COMMENTS
Vision: H test: no deficits noted, peripheral: no deficits noted; Coordination: Finger to thumb opposition: intact bilaterally; DDK: intact bilaterally

## 2017-04-07 NOTE — OCCUPATIONAL THERAPY INITIAL EVALUATION ADULT - PERTINENT HX OF CURRENT PROBLEM, REHAB EVAL
36 y.o female without significant PMHx,was found to have a frontal tumor by MRI after patient noticed a bony protuberance in the forehead.  No neurological symptoms, no pain.  Patient presents for pre opertaive cerebral angiogram and possible endovascular embolization prior to next day surgical resection. Cerebral angiogram 4/5 and Bifrontal craniectomy, resection of bifrontal extra-axial lesion 4/6.

## 2017-04-07 NOTE — PROGRESS NOTE ADULT - ATTENDING COMMENTS
PLAN:   NEURO: neurochecks q4h, VSq1 x 3 hours if stable then q4h and stepdown; PRN pain meds with Tylenol,  d/c percocet; start tramadol  Frontal ossous mass:  s/p angio embo - frozen = meningioma; decrease steroids to q12h  seizure prophylaxis:  levetiracetam 500mg IV BID x 5 days (stop date 04/10/17)  REHAB:  physical therapy evaluation and management    EARLY MOB:  OOB to chair ambulating    PULM:  Room air, incentive spirometry  CARDIO:  SBP goal 90-150mm Hg  ENDO:  Blood sugar goals 140-180 mg/dL, continue insulin sliding scale  GI:  protonix wihle on steroids; docusate senna  DIET: regular diet  RENAL:  IV locked  HEM/ONC: Hb stable  VTE Prophylaxis:  SCDs, baseline doppler on 04/06 - NEG; start SQL tonight if ok with Dr. Yarbrough  ID: afebrile, no leukocytosis, off ancef  Social:  at bedside, updated today    Patient at high risk for neurological deterioration or death due to:  seizure, intracranial bleed, aspiration.  Critical care time, excluding procedures: 45 minutes. PLAN:   NEURO: neurochecks q4h, VSq1 x 3 hours if stable then q4h and stepdown; PRN pain meds with Tylenol,  d/c percocet; start tramadol  Frontal ossous mass:  s/p angio embo - frozen = meningioma; decrease steroids to q12h  seizure prophylaxis:  levetiracetam 500mg IV BID x 5 days (stop date 04/10/17)  REHAB:  physical therapy evaluation and management    EARLY MOB:  OOB to chair ambulate as tolerated    PULM:  Room air, incentive spirometry  CARDIO:  SBP goal 90-150mm Hg  ENDO:  Blood sugar goals 140-180 mg/dL, continue insulin sliding scale  GI:  PPI while on steroids; docusate senna  DIET: regular diet  RENAL:  IV locked  HEM/ONC: Hb stable  VTE Prophylaxis:  SCDs, baseline doppler on 04/06 - NEG; start SQL tonight if ok with Dr. Yarbrough  ID: afebrile, no leukocytosis, off ancef  Social:  at bedside, updated today    Patient at high risk for neurological deterioration or death due to:  seizure, intracranial bleed, aspiration.  Critical care time, excluding procedures: 45 minutes.

## 2017-04-07 NOTE — PROGRESS NOTE ADULT - SUBJECTIVE AND OBJECTIVE BOX
HPI:  36 y.o female without significant PMHx,was found to have a frontal tumor by MRI after patient noticed a bony protuberance in the forehead.  No neurological symptoms, no pain.  Patient presents for pre opertaive cerebral angiogram and possible endovascular embolization prior to next day surgical resection. (05 Apr 2017 08:18)    OVERNIGHT EVENTS: No acute events overnight.  Vital Signs Last 24 Hrs  T(C): 36.7, Max: 37.1 (04-06 @ 22:13)  T(F): 98.1, Max: 98.7 (04-06 @ 22:13)  HR: 80 (66 - 88)  BP: 85/76 (80/54 - 109/67)  BP(mean): 81 (67 - 81)  RR: 13 (12 - 26)  SpO2: 99% (97% - 100%)    I&O's Detail  I & Os for 24h ending 07 Apr 2017 07:00  =============================================  IN:    Oral Fluid: 1150 ml    sodium chloride 0.9%: 825 ml    IV PiggyBack: 150 ml    Total IN: 2125 ml  ---------------------------------------------  OUT:    Indwelling Catheter - Urethral: 2490 ml    Voided: 775 ml    Accordian: 155 ml    Total OUT: 3420 ml  ---------------------------------------------  Total NET: -1295 ml    I & Os for current day (as of 07 Apr 2017 08:05)  =============================================  IN:    Total IN: 0 ml  ---------------------------------------------  OUT:    Total OUT: 0 ml  ---------------------------------------------  Total NET: 0 ml    I&O's Summary  I & Os for 24h ending 07 Apr 2017 07:00  =============================================  IN: 2125 ml / OUT: 3420 ml / NET: -1295 ml    I & Os for current day (as of 07 Apr 2017 08:05)  =============================================  IN: 0 ml / OUT: 0 ml / NET: 0 ml      PHYSICAL EXAM:  Neurological: AAOx3, FC, speech coherent  CNII-XII: EOM intact, PERRL  Motor: MAEx4 5/5 UE and LE B/L         [x] warm well perfused; capillary refill <3 seconds   Sensation: [x] intact to light touch  [] decreased:   Incision/Wound: Scalp incision site C/D/I, staples in place    TUBES/LINES:  [] CVC  [] A-line  [] Lumbar Drain  [] Ventriculostomy  [x] Other: Subgaleal JESSICA draining serosanguinous fluid    DIET:  [] NPO  [x] Mechanical  [] Tube feeds    LABS:                        9.9    9.7   )-----------( 177      ( 07 Apr 2017 04:59 )             30.1     04-07    142  |  108  |  5<L>  ----------------------------<  126<H>  3.9   |  27  |  0.64    Ca    7.8<L>      07 Apr 2017 04:58  Phos  4.3     04-07  Mg     2.0     04-07      PT/INR - ( 06 Apr 2017 06:21 )   PT: 13.2 sec;   INR: 1.19          PTT - ( 06 Apr 2017 06:21 )  PTT:27.1 sec        CAPILLARY BLOOD GLUCOSE  124 (07 Apr 2017 07:30)  181 (06 Apr 2017 21:48)      Drug Levels: [] N/A    CSF Analysis: [] N/A      Allergies    No Known Allergies    Intolerances      MEDICATIONS:  Antibiotics:    Neuro:  acetaminophen   Tablet. 650milliGRAM(s) Oral every 6 hours PRN  levETIRAcetam 500milliGRAM(s) Oral two times a day  oxyCODONE  5 mG/acetaminophen 325 mG 2Tablet(s) Oral every 4 hours PRN  acetaminophen  IVPB. 1000milliGRAM(s) IV Intermittent once    Anticoagulation:    OTHER:  dexamethasone  IVPB 2milliGRAM(s) IV Intermittent every 8 hours  pantoprazole    Tablet 40milliGRAM(s) Oral before breakfast  insulin lispro (HumaLOG) corrective regimen sliding scale  SubCutaneous Before meals and at bedtime  dextrose Gel 1Dose(s) Oral once PRN  dextrose 50% Injectable 12.5Gram(s) IV Push once  dextrose 50% Injectable 25Gram(s) IV Push once  dextrose 50% Injectable 25Gram(s) IV Push once  glucagon  Injectable 1milliGRAM(s) IntraMuscular once PRN    IVF:  dextrose 5%. 1000milliLiter(s) IV Continuous <Continuous>    CULTURES:    RADIOLOGY & ADDITIONAL TESTS:      ASSESSMENT:  36y Female s/p bifrontal craniectomy for tumor resection and cranioplasty    PLAN:  NEURO:  -neuro checks  -pain control  -continue keppra 500 BID  -continue decadron 2q8  -monitor JESSICA output  CARDIOVASCULAR:  -SBO goal 100-150    PULMONARY:  -room air    RENAL:  -IVL    GI:  -docusate/senna    HEME:  -H/H stable, no issues    ID:  -afebrile, no issues    ENDO:  -ISS    DVT PROPHYLAXIS:  [x] Venodynes                                [] Heparin/Lovenox    -D/w Dr. Summers/Dr. Yarbrough

## 2017-04-07 NOTE — PHYSICAL THERAPY INITIAL EVALUATION ADULT - PERTINENT HX OF CURRENT PROBLEM, REHAB EVAL
Patient is a 36 year old female found to have a frontal tumor by MRI after patient noticed a bony protuberance in the forehead.

## 2017-04-07 NOTE — OCCUPATIONAL THERAPY INITIAL EVALUATION ADULT - MANUAL MUSCLE TESTING RESULTS, REHAB EVAL
no strength deficits were identified/smile symmetrical, tongue protrusion in midline, cheeks puff and eyebrows elevation grossly intact; bilateral upper extremities 5/5 all joints

## 2017-04-08 ENCOUNTER — TRANSCRIPTION ENCOUNTER (OUTPATIENT)
Age: 37
End: 2017-04-08

## 2017-04-08 LAB
ANION GAP SERPL CALC-SCNC: 8 MMOL/L — LOW (ref 9–16)
BUN SERPL-MCNC: 5 MG/DL — LOW (ref 7–23)
CALCIUM SERPL-MCNC: 8.2 MG/DL — LOW (ref 8.5–10.5)
CHLORIDE SERPL-SCNC: 108 MMOL/L — SIGNIFICANT CHANGE UP (ref 96–108)
CO2 SERPL-SCNC: 26 MMOL/L — SIGNIFICANT CHANGE UP (ref 22–31)
CREAT SERPL-MCNC: 0.66 MG/DL — SIGNIFICANT CHANGE UP (ref 0.5–1.3)
GLUCOSE SERPL-MCNC: 100 MG/DL — HIGH (ref 70–99)
HCT VFR BLD CALC: 28 % — LOW (ref 34.5–45)
HGB BLD-MCNC: 9.2 G/DL — LOW (ref 11.5–15.5)
MAGNESIUM SERPL-MCNC: 2.2 MG/DL — SIGNIFICANT CHANGE UP (ref 1.6–2.4)
MCHC RBC-ENTMCNC: 28.8 PG — SIGNIFICANT CHANGE UP (ref 27–34)
MCHC RBC-ENTMCNC: 32.9 G/DL — SIGNIFICANT CHANGE UP (ref 32–36)
MCV RBC AUTO: 87.5 FL — SIGNIFICANT CHANGE UP (ref 80–100)
PHOSPHATE SERPL-MCNC: 3.2 MG/DL — SIGNIFICANT CHANGE UP (ref 2.5–4.5)
PLATELET # BLD AUTO: 141 K/UL — LOW (ref 150–400)
POTASSIUM SERPL-MCNC: 3.7 MMOL/L — SIGNIFICANT CHANGE UP (ref 3.5–5.3)
POTASSIUM SERPL-SCNC: 3.7 MMOL/L — SIGNIFICANT CHANGE UP (ref 3.5–5.3)
RBC # BLD: 3.2 M/UL — LOW (ref 3.8–5.2)
RBC # FLD: 14.4 % — SIGNIFICANT CHANGE UP (ref 10.3–16.9)
SODIUM SERPL-SCNC: 142 MMOL/L — SIGNIFICANT CHANGE UP (ref 135–145)
WBC # BLD: 7.7 K/UL — SIGNIFICANT CHANGE UP (ref 3.8–10.5)
WBC # FLD AUTO: 7.7 K/UL — SIGNIFICANT CHANGE UP (ref 3.8–10.5)

## 2017-04-08 PROCEDURE — 70553 MRI BRAIN STEM W/O & W/DYE: CPT | Mod: 26

## 2017-04-08 RX ORDER — SODIUM CHLORIDE 9 MG/ML
500 INJECTION INTRAMUSCULAR; INTRAVENOUS; SUBCUTANEOUS ONCE
Qty: 0 | Refills: 0 | Status: DISCONTINUED | OUTPATIENT
Start: 2017-04-08 | End: 2017-04-10

## 2017-04-08 RX ADMIN — TRAMADOL HYDROCHLORIDE 25 MILLIGRAM(S): 50 TABLET ORAL at 13:00

## 2017-04-08 RX ADMIN — Medication 100 MILLIGRAM(S): at 05:46

## 2017-04-08 RX ADMIN — LEVETIRACETAM 500 MILLIGRAM(S): 250 TABLET, FILM COATED ORAL at 18:03

## 2017-04-08 RX ADMIN — TRAMADOL HYDROCHLORIDE 25 MILLIGRAM(S): 50 TABLET ORAL at 12:46

## 2017-04-08 RX ADMIN — Medication 100 MILLIGRAM(S): at 15:19

## 2017-04-08 RX ADMIN — Medication 650 MILLIGRAM(S): at 21:35

## 2017-04-08 RX ADMIN — Medication 2 MILLIGRAM(S): at 18:03

## 2017-04-08 RX ADMIN — Medication 650 MILLIGRAM(S): at 22:35

## 2017-04-08 RX ADMIN — Medication 2 MILLIGRAM(S): at 05:48

## 2017-04-08 RX ADMIN — PANTOPRAZOLE SODIUM 40 MILLIGRAM(S): 20 TABLET, DELAYED RELEASE ORAL at 05:48

## 2017-04-08 RX ADMIN — LEVETIRACETAM 500 MILLIGRAM(S): 250 TABLET, FILM COATED ORAL at 05:46

## 2017-04-08 RX ADMIN — Medication 650 MILLIGRAM(S): at 02:16

## 2017-04-08 RX ADMIN — ENOXAPARIN SODIUM 40 MILLIGRAM(S): 100 INJECTION SUBCUTANEOUS at 21:05

## 2017-04-08 RX ADMIN — Medication 650 MILLIGRAM(S): at 02:45

## 2017-04-08 RX ADMIN — Medication 100 MILLIGRAM(S): at 21:05

## 2017-04-08 NOTE — PROGRESS NOTE ADULT - SUBJECTIVE AND OBJECTIVE BOX
ENT [facility] DAILY PROGRESS NOTE    HPI: 36F s/p resection of likely frontal meningioma with mesh cranioplasty    Interval: JESSICA removed yesterday. NAEO. No complaints this morning. Postop MRI with no obvious residual tumor.      ENT EXAM-   AFVSS  NAD. comfortable  no increased WOB. no stridor  bicoronal incision intact. staples in place. no dehiscence or erythema  head wrap replace.d

## 2017-04-08 NOTE — PROGRESS NOTE ADULT - ASSESSMENT
36y Female s/p bifrontal craniectomy for tumor resection and cranioplasty  -care per NSGY  -neuro checks  -pain control  -continue keppra 500 BID  -continue decadron 2q8  - scds  - SDU

## 2017-04-08 NOTE — PROGRESS NOTE ADULT - SUBJECTIVE AND OBJECTIVE BOX
Subjective: Seen/evaluated at bedside.  Doing well, no new complaints.  No overnight events    T(C): 37.1, Max: 37.6 (04-07 @ 17:45)  HR: 58 (54 - 88)  BP: 95/54 (84/51 - 99/63)  RR: 16 (12 - 32)  SpO2: 99% (95% - 100%)  Wt(kg): --    Exam: A/Ox3, FC, speech clear  TARANGO 5/5 strength, no drift  CN II-XII grossly intact    CBC Full  -  ( 07 Apr 2017 04:59 )  WBC Count : 9.7 K/uL  Hemoglobin : 9.9 g/dL  Hematocrit : 30.1 %  Platelet Count - Automated : 177 K/uL  Mean Cell Volume : 85.3 fL  Mean Cell Hemoglobin : 28.0 pg  Mean Cell Hemoglobin Concentration : 32.9 g/dL  Auto Neutrophil # : x  Auto Lymphocyte # : x  Auto Monocyte # : x  Auto Eosinophil # : x  Auto Basophil # : x  Auto Neutrophil % : x  Auto Lymphocyte % : x  Auto Monocyte % : x  Auto Eosinophil % : x  Auto Basophil % : x    04-07    142  |  108  |  5<L>  ----------------------------<  126<H>  3.9   |  27  |  0.64    Ca    7.8<L>      07 Apr 2017 04:58  Phos  4.3     04-07  Mg     2.0     04-07      PT/INR - ( 06 Apr 2017 06:21 )   PT: 13.2 sec;   INR: 1.19          PTT - ( 06 Apr 2017 06:21 )  PTT:27.1 sec      Wound: C/D/I, +staples    Assessment/Plan: s/p bifrontal crani for meningioma resection  -PT/OT/OOB  -MRI pending  -Neuro checks  -DVT/GI ppx  -Dispo planning  -D/W Dr. Yarbrough

## 2017-04-09 LAB
ANION GAP SERPL CALC-SCNC: 8 MMOL/L — LOW (ref 9–16)
BUN SERPL-MCNC: 6 MG/DL — LOW (ref 7–23)
CALCIUM SERPL-MCNC: 8.3 MG/DL — LOW (ref 8.5–10.5)
CHLORIDE SERPL-SCNC: 106 MMOL/L — SIGNIFICANT CHANGE UP (ref 96–108)
CO2 SERPL-SCNC: 28 MMOL/L — SIGNIFICANT CHANGE UP (ref 22–31)
CREAT SERPL-MCNC: 0.66 MG/DL — SIGNIFICANT CHANGE UP (ref 0.5–1.3)
GLUCOSE SERPL-MCNC: 104 MG/DL — HIGH (ref 70–99)
HCT VFR BLD CALC: 27.3 % — LOW (ref 34.5–45)
HGB BLD-MCNC: 9.1 G/DL — LOW (ref 11.5–15.5)
MAGNESIUM SERPL-MCNC: 2.1 MG/DL — SIGNIFICANT CHANGE UP (ref 1.6–2.4)
MCHC RBC-ENTMCNC: 29 PG — SIGNIFICANT CHANGE UP (ref 27–34)
MCHC RBC-ENTMCNC: 33.3 G/DL — SIGNIFICANT CHANGE UP (ref 32–36)
MCV RBC AUTO: 86.9 FL — SIGNIFICANT CHANGE UP (ref 80–100)
PHOSPHATE SERPL-MCNC: 3.4 MG/DL — SIGNIFICANT CHANGE UP (ref 2.5–4.5)
PLATELET # BLD AUTO: 140 K/UL — LOW (ref 150–400)
POTASSIUM SERPL-MCNC: 3.5 MMOL/L — SIGNIFICANT CHANGE UP (ref 3.5–5.3)
POTASSIUM SERPL-SCNC: 3.5 MMOL/L — SIGNIFICANT CHANGE UP (ref 3.5–5.3)
RBC # BLD: 3.14 M/UL — LOW (ref 3.8–5.2)
RBC # FLD: 13.8 % — SIGNIFICANT CHANGE UP (ref 10.3–16.9)
SODIUM SERPL-SCNC: 142 MMOL/L — SIGNIFICANT CHANGE UP (ref 135–145)
WBC # BLD: 5.2 K/UL — SIGNIFICANT CHANGE UP (ref 3.8–10.5)
WBC # FLD AUTO: 5.2 K/UL — SIGNIFICANT CHANGE UP (ref 3.8–10.5)

## 2017-04-09 RX ADMIN — SENNA PLUS 2 TABLET(S): 8.6 TABLET ORAL at 22:37

## 2017-04-09 RX ADMIN — Medication 650 MILLIGRAM(S): at 06:20

## 2017-04-09 RX ADMIN — Medication 650 MILLIGRAM(S): at 19:38

## 2017-04-09 RX ADMIN — Medication 650 MILLIGRAM(S): at 13:30

## 2017-04-09 RX ADMIN — ENOXAPARIN SODIUM 40 MILLIGRAM(S): 100 INJECTION SUBCUTANEOUS at 22:38

## 2017-04-09 RX ADMIN — Medication 100 MILLIGRAM(S): at 22:37

## 2017-04-09 RX ADMIN — Medication 100 MILLIGRAM(S): at 06:20

## 2017-04-09 RX ADMIN — Medication 650 MILLIGRAM(S): at 12:30

## 2017-04-09 RX ADMIN — Medication 2 MILLIGRAM(S): at 07:39

## 2017-04-09 RX ADMIN — Medication 100 MILLIGRAM(S): at 13:56

## 2017-04-09 RX ADMIN — LEVETIRACETAM 500 MILLIGRAM(S): 250 TABLET, FILM COATED ORAL at 06:20

## 2017-04-09 RX ADMIN — PANTOPRAZOLE SODIUM 40 MILLIGRAM(S): 20 TABLET, DELAYED RELEASE ORAL at 07:39

## 2017-04-09 RX ADMIN — LEVETIRACETAM 500 MILLIGRAM(S): 250 TABLET, FILM COATED ORAL at 18:02

## 2017-04-09 RX ADMIN — Medication 2 MILLIGRAM(S): at 18:59

## 2017-04-09 RX ADMIN — Medication 650 MILLIGRAM(S): at 18:59

## 2017-04-09 RX ADMIN — Medication 650 MILLIGRAM(S): at 07:20

## 2017-04-09 NOTE — PROGRESS NOTE ADULT - ASSESSMENT
36y Female s/p bifrontal craniectomy for tumor resection and cranioplasty  - care per NSGY  - daily head wrap changes by ENT  - neuro checks  - pain control  - continue keppra 500 BID  - continue decadron 2q8  - scds  - SDU

## 2017-04-09 NOTE — PROGRESS NOTE ADULT - SUBJECTIVE AND OBJECTIVE BOX
ENT [facility] DAILY PROGRESS NOTE    Interval events: OPAL. Doing well. Head wrap changed daily.       MEDICATIONS:  IV fluids:  dextrose 5%. 1000milliLiter(s) IV Continuous <Continuous>  sodium chloride 0.9% Bolus 500milliLiter(s) IV Bolus once    Hematologic/Anticoagulation:  enoxaparin Injectable 40milliGRAM(s) SubCutaneous at bedtime    Pain medications/Neuro:  acetaminophen   Tablet. 650milliGRAM(s) Oral every 6 hours PRN  levETIRAcetam 500milliGRAM(s) Oral two times a day  acetaminophen  IVPB. 1000milliGRAM(s) IV Intermittent once  traMADol 25milliGRAM(s) Oral every 6 hours PRN  oxyCODONE  5 mG/acetaminophen 325 mG 1Tablet(s) Oral every 4 hours PRN    Endocrine Medications:   insulin lispro (HumaLOG) corrective regimen sliding scale  SubCutaneous Before meals and at bedtime  dextrose Gel 1Dose(s) Oral once PRN  dextrose 50% Injectable 12.5Gram(s) IV Push once  dextrose 50% Injectable 25Gram(s) IV Push once  dextrose 50% Injectable 25Gram(s) IV Push once  glucagon  Injectable 1milliGRAM(s) IntraMuscular once PRN  dexamethasone     Tablet 2milliGRAM(s) Oral every 12 hours    All other standing medications:   pantoprazole    Tablet 40milliGRAM(s) Oral before breakfast  docusate sodium 100milliGRAM(s) Oral three times a day  senna 2Tablet(s) Oral at bedtime    Vital Signs Last 24 Hrs  T(C): 36.4, Max: 37.1 (04-08 @ 14:12)  T(F): 97.6, Max: 98.8 (04-08 @ 14:12)  HR: 60 (56 - 72)  BP: 96/55 (86/54 - 104/53)  BP(mean): 74 (65 - 74)  RR: 17 (17 - 18)  SpO2: 99% (99% - 100%)      I & Os for current day (as of 04-09 @ 10:08)  =============================================  IN:    Total IN: 0 ml  ---------------------------------------------  OUT:    Voided: 400 ml    Total OUT: 400 ml  ---------------------------------------------  Total NET: -400 ml        PHYSICAL EXAM:    ENT EXAM-   Constitutional: Well-developed, well-nourished.    Head:  normocephalic, atraumatic.   Ears:  Ear canals both clear.  Tympanic membranes both intact; no effusion or retraction.  Nose:  Septum intact, midline, deviated.  Inferior turbinates normal bilateral  OC/OP:  Tonsils present/absent. Floor of mouth, buccal mucosa, lips, hard palate, soft palate, uvula, posterior pharyngeal wall normal.  Mucosa moist.  Neck:  Trachea midline.  Thyroid, parotid and submandibular glands normal.  Lymph:  No cervical adenopathy.    MULTISYSTEM EXAM-  Neuro/Psych:  A&O x 3.  Mood stable.  Affect appropriate  Cranial nerves: 2-12 grossly intact bilaterally.  Eyes:  EOMI, PERRL no nystagmus.  Pulm:  No dyspnea, non-labored breathing on room air  Cardiovascular: Carotid pulses 2+ bilaterally.  No periphreal edema.  Skin:  No rash or lesions on exposed skin of head/neck    LABS:  CBC-                        9.1    5.2   )-----------( 140      ( 09 Apr 2017 06:01 )             27.3     04-09    142  |  106  |  6<L>  ----------------------------<  104<H>  3.5   |  28  |  0.66    Ca    8.3<L>      09 Apr 2017 06:01  Phos  3.4     04-09  Mg     2.1     04-09      RADIOLOGY & ADDITIONAL STUDIES:  MRI head 4/8: Status post bifrontal craniectomy and cranioplasty and resection of   frontal calvarial and dural mass. No evidence of residual enhancing   nodular or masslike tumor. Thin and diffuse dural enhancement over the   frontal convexities inferior to craniectomy may be reactive. En plaque   residual tumor is not completely excluded. Suggest comparison to any   preoperative MR imaging to determine if diffuse dural enhancement   predated the surgery.    Intradurally, there is small volume resection/devitalization change at   the left paramedian frontal lobe without enhancement.

## 2017-04-09 NOTE — DISCHARGE NOTE ADULT - PATIENT PORTAL LINK FT
“You can access the [employer] Patient Portal, offered by [facility], by registering with the following website: [email or web address]”

## 2017-04-09 NOTE — DISCHARGE NOTE ADULT - HOSPITAL COURSE
37 y/o female without significant PMHx,was found to have a frontal tumor by MRI after patient noticed a bony protuberance in the forehead. No neurological symptoms, no pain. On 4/5/17 Pt underwent femoral cerebral angiogram. Findings consistent with moderately vascular bifrontal parasagittal meningioma, supplied via bilateral middle meningeal arteries, left anterior falcine artery, and left superficial temporal artery. 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 Endovascular embolization performed via bilateral middle meningeal arteries, and left superficial temporal artery. On 4/6/17 Pt underwent bifrontal craniectomy, resection of bifrontal extra-axial lesion with transosseous invasion, duraplasty (Alloderm) and cranioplasty (custom-made PEEK implant). Post-op dizziness and low blood pressure, treated with IV fluids and observation. Post-operative hospital course without further complications. PT cleared for discharge home. Pt neurologically stable for discharge. 37 y/o female without significant PMHx,was found to have a frontal tumor by MRI after patient noticed a bony protuberance in the forehead. No neurological symptoms, no pain. On 4/5/17 Pt underwent femoral cerebral angiogram. Findings consistent with moderately vascular bifrontal parasagittal meningioma, supplied via bilateral middle meningeal arteries, left anterior falcine artery, and left superficial temporal artery. 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 Endovascular embolization performed via bilateral middle meningeal arteries, and left superficial temporal artery. On 4/6/17 Pt underwent bifrontal craniectomy, resection of bifrontal extra-axial lesion with transosseous invasion, duraplasty (Alloderm) and cranioplasty (custom-made PEEK implant). Post-op dizziness and low blood pressure, treated with IV fluids and observation. Post-operative hospital course without further complications. PT cleared for discharge home. Pt neurologically stable for discharge to home.

## 2017-04-09 NOTE — DISCHARGE NOTE ADULT - NS AS ACTIVITY OBS
Do not drive or operate machinery/No Heavy lifting/straining/Walking-Outdoors allowed/Walking-Indoors allowed

## 2017-04-09 NOTE — DISCHARGE NOTE ADULT - PLAN OF CARE
Resume normal activities of daily living 1. You should wash your hair starting 24 hours after being home. Use your normal  shampoo. During the shampoo, massage the staples to remove any dried blood  or crusting. This keeps your wound clean and helps healing. you should shampoo everyday.  2. Pat the wound dry with a clean towel afterwards and leave it open to air. Do not  apply creams or ointments to the wound. Mild swelling around the incision is  common.  3. Follow-up with one of the Nurse Practitioners for suture or staple removal 7-10  days after surgery. Call our office at [phone] to set up the appointment  once you get home.  4. Inform the doctor immediately if you have a fever (above 101), chills, night  sweats, wound drainage, increasing wound redness or pain, nausea, vomiting, or  worsening headache. 1. You should wash your hair starting 24 hours after being home. Use your normal  shampoo. During the shampoo, massage the staples to remove any dried blood  or crusting. This keeps your wound clean and helps healing. you should shampoo everyday.  2. Pat the wound dry with a clean towel afterwards and leave it open to air. Do not  apply creams or ointments to the wound. Mild swelling around the incision is  common.  3. Follow-up with one of the Nurse Practitioners for suture or staple removal 7-10  days after surgery. Call our office at [phone] to set up the appointment  once you get home.  4. Inform the doctor immediately if you have a fever (above 101), chills, night  sweats, wound drainage, increasing wound redness or pain, nausea, vomiting, or  worsening headache.  Continue steroids until otherwise advised with Dr Yarbrough  F/U Dr Flower this Wednesday as directed

## 2017-04-09 NOTE — DISCHARGE NOTE ADULT - ADDITIONAL INSTRUCTIONS
B. ACTIVITY LEVEL  1. Fatigue is common following brain surgery. Listen to your body and rest if  you feel tired.  2. You should get up and walk around every hour during the daytime.  3. No bending, lifting or twisting for the first 3 weeks, but walking is  recommended.  4. Drink plenty of water.  5. In summer, stay out of direct sun and heat; remain in the shade when  outdoors.   Pain Medications: You may be given a narcotic pain reliever such as Percocet  (oxycodone-acetaminophen). These are for use only for a short time after  surgery. They may cause drowsiness, nausea, and constipation. Take after food   and drink plenty of water to help reduce side effects. Do not drink alcohol with  these medications. B. ACTIVITY LEVEL  1. Fatigue is common following brain surgery. Listen to your body and rest if  you feel tired.  2. You should get up and walk around every hour during the daytime.  3. No bending, lifting or twisting for the first 3 weeks, but walking is  recommended.  4. Drink plenty of water.   Pain Medications: You may be given a narcotic pain reliever such as Percocet  (oxycodone-acetaminophen). These are for use only for a short time after  surgery. They may cause drowsiness, nausea, and constipation. Take after food   and drink plenty of water to help reduce side effects. Do not drink alcohol with  these medications.

## 2017-04-09 NOTE — PROGRESS NOTE ADULT - SUBJECTIVE AND OBJECTIVE BOX
HPI:  36 y.o female without significant PMHx,was found to have a frontal tumor by MRI after patient noticed a bony protuberance in the forehead.  No neurological symptoms, no pain.  Patient presents for pre opertaive cerebral angiogram and possible endovascular embolization prior to next day surgical resection. (05 Apr 2017 08:18)    OVERNIGHT EVENTS: Overnight, Pt complains of one episode of dizziness, low SBP. Received 1/2 liter NS bolus. Pt denies acute changes in vision, loss of sensation/weakness of extremities.    Vital Signs Last 24 Hrs  T(C): 36.6, Max: 37.1 (04-08 @ 14:12)  T(F): 97.8, Max: 98.8 (04-08 @ 14:12)  HR: 56 (56 - 76)  BP: 95/51 (86/54 - 104/53)  BP(mean): 67 (65 - 67)  RR: 18 (16 - 18)  SpO2: 99% (98% - 100%)    I&O's Summary  I & Os for 24h ending 08 Apr 2017 07:00  =============================================  IN: 900 ml / OUT: 2700 ml / NET: -1800 ml    I & Os for current day (as of 09 Apr 2017 05:28)  =============================================  IN: 0 ml / OUT: 400 ml / NET: -400 ml    PHYSICAL EXAM:  Neurological: AAOx3, FC, speech coherent  CNII-XII: EOM intact, PERRL  Motor: MAEx4 5/5 UE and LE B/L         [x] warm well perfused; capillary refill <3 seconds   Sensation: [x] intact to light touch  [] decreased:   Incision/Wound: Scalp incision site C/D/I, staples in place    TUBES/LINES:  [] Gonzalez  [] Lumbar Drain  [] Wound Drains  [] Others      DIET:  [] NPO  [x] Mechanical  [] Tube feeds    LABS:                        9.2    7.7   )-----------( 141      ( 08 Apr 2017 06:08 )             28.0     04-08    142  |  108  |  5<L>  ----------------------------<  100<H>  3.7   |  26  |  0.66    Ca    8.2<L>      08 Apr 2017 06:08  Phos  3.2     04-08  Mg     2.2     04-08              CAPILLARY BLOOD GLUCOSE  129 (08 Apr 2017 20:30)  120 (08 Apr 2017 16:25)  133 (08 Apr 2017 11:08)  99 (08 Apr 2017 05:54)      Drug Levels: [] N/A    CSF Analysis: [] N/A      Allergies    No Known Allergies    Intolerances      MEDICATIONS:  Antibiotics:    Neuro:  acetaminophen   Tablet. 650milliGRAM(s) Oral every 6 hours PRN  levETIRAcetam 500milliGRAM(s) Oral two times a day  acetaminophen  IVPB. 1000milliGRAM(s) IV Intermittent once  traMADol 25milliGRAM(s) Oral every 6 hours PRN  oxyCODONE  5 mG/acetaminophen 325 mG 1Tablet(s) Oral every 4 hours PRN    Anticoagulation:  enoxaparin Injectable 40milliGRAM(s) SubCutaneous at bedtime    OTHER:  pantoprazole    Tablet 40milliGRAM(s) Oral before breakfast  insulin lispro (HumaLOG) corrective regimen sliding scale  SubCutaneous Before meals and at bedtime  dextrose Gel 1Dose(s) Oral once PRN  dextrose 50% Injectable 12.5Gram(s) IV Push once  dextrose 50% Injectable 25Gram(s) IV Push once  dextrose 50% Injectable 25Gram(s) IV Push once  glucagon  Injectable 1milliGRAM(s) IntraMuscular once PRN  dexamethasone     Tablet 2milliGRAM(s) Oral every 12 hours  docusate sodium 100milliGRAM(s) Oral three times a day  senna 2Tablet(s) Oral at bedtime    IVF:  dextrose 5%. 1000milliLiter(s) IV Continuous <Continuous>  sodium chloride 0.9% Bolus 500milliLiter(s) IV Bolus once    CULTURES:    RADIOLOGY & ADDITIONAL TESTS:      ASSESSMENT:  36y Female s/p bifrontal craniectomy for meningioma resection, cranioplasty    PLAN:  -Neuro checks  -Continue keppra 500 BID  -Continue decadron 2q12  -PPI while on steroids  -Bowel regimen  -PT/OT  -DVT prophylaxis: SCDs, SQL  -Dispo planning  -D/w Dr. Yarbrough

## 2017-04-09 NOTE — DISCHARGE NOTE ADULT - CARE PROVIDER_API CALL
Lucius Yarbrough), Neurological Surgery  [address] [address]  Phone: [phone]  Fax: [phone] Lucius Yarbrough), Neurological Surgery  [address] [address]  Phone: [phone]  Fax: [phone]    Wilfred Flower), Otolaryngology  [address] [place] [address]  Phone: [phone]  Fax: [phone]

## 2017-04-09 NOTE — DISCHARGE NOTE ADULT - MEDICATION SUMMARY - MEDICATIONS TO TAKE
I will START or STAY ON the medications listed below when I get home from the hospital:    dexamethasone 2 mg oral tablet  -- 1 tab(s) by mouth every 12 hours  -- Indication: For anti inflammatory    acetaminophen 325 mg oral tablet  -- 2 tab(s) by mouth every 6 hours, As needed, Mild Pain (1 - 3)  -- Indication: For pain    acetaminophen 10 mg/mL intravenous solution  -- 100 milliliter(s) intravenous once  -- Indication: For do not take this    acetaminophen-oxyCODONE 325 mg-5 mg oral tablet  -- 1 tab(s) by mouth every 4 hours, As needed, Severe Pain (7 - 10) MDD:6  -- Indication: For Moderate pain    levETIRAcetam 500 mg oral tablet  -- 1 tab(s) by mouth 2 times a day  -- Indication: For prevent seizures    senna oral tablet  -- 2 tab(s) by mouth once a day (at bedtime)  -- Indication: For fiber pill    docusate sodium 100 mg oral capsule  -- 1 cap(s) by mouth 3 times a day  -- Indication: For Stool softner    pantoprazole 40 mg oral delayed release tablet  -- 1 tab(s) by mouth once a day (before a meal)  -- Indication: For antacid

## 2017-04-09 NOTE — DISCHARGE NOTE ADULT - CARE PLAN
Principal Discharge DX:	Meningioma  Goal:	Resume normal activities of daily living  Instructions for follow-up, activity and diet:	1. You should wash your hair starting 24 hours after being home. Use your normal  shampoo. During the shampoo, massage the staples to remove any dried blood  or crusting. This keeps your wound clean and helps healing. you should shampoo everyday.  2. Pat the wound dry with a clean towel afterwards and leave it open to air. Do not  apply creams or ointments to the wound. Mild swelling around the incision is  common.  3. Follow-up with one of the Nurse Practitioners for suture or staple removal 7-10  days after surgery. Call our office at [phone] to set up the appointment  once you get home.  4. Inform the doctor immediately if you have a fever (above 101), chills, night  sweats, wound drainage, increasing wound redness or pain, nausea, vomiting, or  worsening headache. Principal Discharge DX:	Meningioma  Goal:	Resume normal activities of daily living  Instructions for follow-up, activity and diet:	1. You should wash your hair starting 24 hours after being home. Use your normal  shampoo. During the shampoo, massage the staples to remove any dried blood  or crusting. This keeps your wound clean and helps healing. you should shampoo everyday.  2. Pat the wound dry with a clean towel afterwards and leave it open to air. Do not  apply creams or ointments to the wound. Mild swelling around the incision is  common.  3. Follow-up with one of the Nurse Practitioners for suture or staple removal 7-10  days after surgery. Call our office at [phone] to set up the appointment  once you get home.  4. Inform the doctor immediately if you have a fever (above 101), chills, night  sweats, wound drainage, increasing wound redness or pain, nausea, vomiting, or  worsening headache.  Continue steroids until otherwise advised with Dr Yarbrough  F/U Dr Flower this Wednesday as directed Principal Discharge DX:	Meningioma  Goal:	Resume normal activities of daily living  Instructions for follow-up, activity and diet:	1. You should wash your hair starting 24 hours after being home. Use your normal  shampoo. During the shampoo, massage the staples to remove any dried blood  or crusting. This keeps your wound clean and helps healing. you should shampoo everyday.  2. Pat the wound dry with a clean towel afterwards and leave it open to air. Do not  apply creams or ointments to the wound. Mild swelling around the incision is  common.  3. Follow-up with one of the Nurse Practitioners for suture or staple removal 7-10  days after surgery. Call our office at [phone] to set up the appointment  once you get home.  4. Inform the doctor immediately if you have a fever (above 101), chills, night  sweats, wound drainage, increasing wound redness or pain, nausea, vomiting, or  worsening headache.  Continue steroids until otherwise advised with Dr Yarbrough  F/U Dr Flower this Wednesday as directed

## 2017-04-09 NOTE — DISCHARGE NOTE ADULT - CARE PROVIDERS DIRECT ADDRESSES
[email or web address]

## 2017-04-10 ENCOUNTER — APPOINTMENT (OUTPATIENT)
Dept: OTOLARYNGOLOGY | Facility: CLINIC | Age: 37
End: 2017-04-10

## 2017-04-10 VITALS
OXYGEN SATURATION: 99 % | SYSTOLIC BLOOD PRESSURE: 104 MMHG | TEMPERATURE: 98 F | DIASTOLIC BLOOD PRESSURE: 71 MMHG | RESPIRATION RATE: 16 BRPM | HEART RATE: 70 BPM

## 2017-04-10 PROCEDURE — C1769: CPT

## 2017-04-10 PROCEDURE — 83735 ASSAY OF MAGNESIUM: CPT

## 2017-04-10 PROCEDURE — 88360 TUMOR IMMUNOHISTOCHEM/MANUAL: CPT

## 2017-04-10 PROCEDURE — 80053 COMPREHEN METABOLIC PANEL: CPT

## 2017-04-10 PROCEDURE — 86923 COMPATIBILITY TEST ELECTRIC: CPT

## 2017-04-10 PROCEDURE — C1887: CPT

## 2017-04-10 PROCEDURE — 86900 BLOOD TYPING SEROLOGIC ABO: CPT

## 2017-04-10 PROCEDURE — 85730 THROMBOPLASTIN TIME PARTIAL: CPT

## 2017-04-10 PROCEDURE — 97161 PT EVAL LOW COMPLEX 20 MIN: CPT

## 2017-04-10 PROCEDURE — 84100 ASSAY OF PHOSPHORUS: CPT

## 2017-04-10 PROCEDURE — A9585: CPT

## 2017-04-10 PROCEDURE — C1713: CPT

## 2017-04-10 PROCEDURE — 86901 BLOOD TYPING SEROLOGIC RH(D): CPT

## 2017-04-10 PROCEDURE — 97535 SELF CARE MNGMENT TRAINING: CPT

## 2017-04-10 PROCEDURE — C1889: CPT

## 2017-04-10 PROCEDURE — 88311 DECALCIFY TISSUE: CPT

## 2017-04-10 PROCEDURE — 36415 COLL VENOUS BLD VENIPUNCTURE: CPT

## 2017-04-10 PROCEDURE — 83036 HEMOGLOBIN GLYCOSYLATED A1C: CPT

## 2017-04-10 PROCEDURE — C1894: CPT

## 2017-04-10 PROCEDURE — 88309 TISSUE EXAM BY PATHOLOGIST: CPT

## 2017-04-10 PROCEDURE — 85027 COMPLETE CBC AUTOMATED: CPT

## 2017-04-10 PROCEDURE — 84702 CHORIONIC GONADOTROPIN TEST: CPT

## 2017-04-10 PROCEDURE — 85610 PROTHROMBIN TIME: CPT

## 2017-04-10 PROCEDURE — 93005 ELECTROCARDIOGRAM TRACING: CPT

## 2017-04-10 PROCEDURE — 88307 TISSUE EXAM BY PATHOLOGIST: CPT

## 2017-04-10 PROCEDURE — 88333 PATH CONSLTJ SURG CYTO XM 1: CPT

## 2017-04-10 PROCEDURE — 80048 BASIC METABOLIC PNL TOTAL CA: CPT

## 2017-04-10 PROCEDURE — 86850 RBC ANTIBODY SCREEN: CPT

## 2017-04-10 PROCEDURE — 88305 TISSUE EXAM BY PATHOLOGIST: CPT

## 2017-04-10 PROCEDURE — 70553 MRI BRAIN STEM W/O & W/DYE: CPT

## 2017-04-10 PROCEDURE — 88331 PATH CONSLTJ SURG 1 BLK 1SPC: CPT

## 2017-04-10 PROCEDURE — 85025 COMPLETE CBC W/AUTO DIFF WBC: CPT

## 2017-04-10 PROCEDURE — 93970 EXTREMITY STUDY: CPT

## 2017-04-10 RX ORDER — DOCUSATE SODIUM 100 MG
1 CAPSULE ORAL
Qty: 0 | Refills: 0 | COMMUNITY
Start: 2017-04-10

## 2017-04-10 RX ORDER — ACETAMINOPHEN 500 MG
2 TABLET ORAL
Qty: 0 | Refills: 0 | COMMUNITY
Start: 2017-04-10

## 2017-04-10 RX ORDER — ACETAMINOPHEN 500 MG
100 TABLET ORAL
Qty: 0 | Refills: 0 | COMMUNITY
Start: 2017-04-10

## 2017-04-10 RX ORDER — DEXAMETHASONE 0.5 MG/5ML
1 ELIXIR ORAL
Qty: 60 | Refills: 0 | OUTPATIENT
Start: 2017-04-10 | End: 2017-05-10

## 2017-04-10 RX ORDER — SENNA PLUS 8.6 MG/1
2 TABLET ORAL
Qty: 0 | Refills: 0 | COMMUNITY
Start: 2017-04-10

## 2017-04-10 RX ORDER — LEVETIRACETAM 250 MG/1
1 TABLET, FILM COATED ORAL
Qty: 60 | Refills: 0 | OUTPATIENT
Start: 2017-04-10 | End: 2017-05-10

## 2017-04-10 RX ORDER — PANTOPRAZOLE SODIUM 20 MG/1
1 TABLET, DELAYED RELEASE ORAL
Qty: 30 | Refills: 0 | OUTPATIENT
Start: 2017-04-10 | End: 2017-05-10

## 2017-04-10 RX ORDER — OXYCODONE HYDROCHLORIDE 5 MG/1
1 TABLET ORAL
Qty: 42 | Refills: 0 | OUTPATIENT
Start: 2017-04-10 | End: 2017-04-17

## 2017-04-10 RX ADMIN — Medication 650 MILLIGRAM(S): at 12:30

## 2017-04-10 RX ADMIN — PANTOPRAZOLE SODIUM 40 MILLIGRAM(S): 20 TABLET, DELAYED RELEASE ORAL at 06:44

## 2017-04-10 RX ADMIN — Medication 100 MILLIGRAM(S): at 06:46

## 2017-04-10 RX ADMIN — Medication 2 MILLIGRAM(S): at 06:45

## 2017-04-10 RX ADMIN — LEVETIRACETAM 500 MILLIGRAM(S): 250 TABLET, FILM COATED ORAL at 06:44

## 2017-04-10 NOTE — PROGRESS NOTE ADULT - SUBJECTIVE AND OBJECTIVE BOX
HPI:  36 y.o female without significant PMHx,was found to have a frontal tumor by MRI after patient noticed a bony protuberance in the forehead.  No neurological symptoms, no pain.  Patient presents for pre opertaive cerebral angiogram and possible endovascular embolization prior to next day surgical resection. (05 Apr 2017 08:18)    OVERNIGHT EVENTS:  Vital Signs Last 24 Hrs  T(C): 36.7, Max: 37.3 (04-10 @ 05:06)  T(F): 98.1, Max: 99.2 (04-10 @ 05:06)  HR: 70 (60 - 70)  BP: 104/71 (92/62 - 104/71)  BP(mean): 77 (77 - 77)  RR: 16 (14 - 17)  SpO2: 99% (97% - 100%)    I&O's Summary    I & Os for current day (as of 10 Apr 2017 10:38)  =============================================  IN: 640 ml / OUT: 1600 ml / NET: -960 ml      PHYSICAL EXAM:  Neurological:A&OX3 Cranial nerves intact  Motor exam: MARIVEL 5/5         [    DIET: Regular    LABS:                        9.1    5.2   )-----------( 140      ( 09 Apr 2017 06:01 )             27.3     04-09    142  |  106  |  6<L>  ----------------------------<  104<H>  3.5   |  28  |  0.66    Ca    8.3<L>      09 Apr 2017 06:01  Phos  3.4     04-09  Mg     2.1     04-09              CAPILLARY BLOOD GLUCOSE  99 (10 Apr 2017 07:10)  118 (09 Apr 2017 21:13)  99 (09 Apr 2017 16:31)  104 (09 Apr 2017 11:44)      Drug Levels: [] N/A    CSF Analysis: [] N/A      Allergies    No Known Allergies    Intolerances      MEDICATIONS:  Antibiotics:    Neuro:  acetaminophen   Tablet. 650milliGRAM(s) Oral every 6 hours PRN  levETIRAcetam 500milliGRAM(s) Oral two times a day  acetaminophen  IVPB. 1000milliGRAM(s) IV Intermittent once  traMADol 25milliGRAM(s) Oral every 6 hours PRN  oxyCODONE  5 mG/acetaminophen 325 mG 1Tablet(s) Oral every 4 hours PRN    Anticoagulation:  enoxaparin Injectable 40milliGRAM(s) SubCutaneous at bedtime    OTHER:  pantoprazole    Tablet 40milliGRAM(s) Oral before breakfast  insulin lispro (HumaLOG) corrective regimen sliding scale  SubCutaneous Before meals and at bedtime  dextrose Gel 1Dose(s) Oral once PRN  dextrose 50% Injectable 12.5Gram(s) IV Push once  dextrose 50% Injectable 25Gram(s) IV Push once  dextrose 50% Injectable 25Gram(s) IV Push once  glucagon  Injectable 1milliGRAM(s) IntraMuscular once PRN  dexamethasone     Tablet 2milliGRAM(s) Oral every 12 hours  docusate sodium 100milliGRAM(s) Oral three times a day  senna 2Tablet(s) Oral at bedtime    IVF:  dextrose 5%. 1000milliLiter(s) IV Continuous <Continuous>  sodium chloride 0.9% Bolus 500milliLiter(s) IV Bolus once        ASSESSMENT:  36y Female s/p Cerbral angiogram preop  s/p Crani resection mass    PLAN:    NEURO:  Monitor neuro status  OT/PT  Pain management  Steroid taper  Continue Keppra  Dispo: Discussed with attending

## 2017-04-10 NOTE — PROGRESS NOTE ADULT - PROVIDER SPECIALTY LIST ADULT
ENT
NSICU
NSICU
Neurosurgery

## 2017-04-10 NOTE — PROGRESS NOTE ADULT - SUBJECTIVE AND OBJECTIVE BOX
ENT [facility] DAILY PROGRESS NOTE    Overnight events/Interval HPI: 36y Female s/p resection of meningioma with cranioplasty on 4/6. POD 4.     OPAL. Doing well, head wrap changed daily.       Allergies  No Known Allergies        MEDICATIONS:  Antiinfectives:     IV fluids:  dextrose 5%. 1000milliLiter(s) IV Continuous <Continuous>  sodium chloride 0.9% Bolus 500milliLiter(s) IV Bolus once    Hematologic/Anticoagulation:  enoxaparin Injectable 40milliGRAM(s) SubCutaneous at bedtime    Pain medications/Neuro:  acetaminophen   Tablet. 650milliGRAM(s) Oral every 6 hours PRN  levETIRAcetam 500milliGRAM(s) Oral two times a day  acetaminophen  IVPB. 1000milliGRAM(s) IV Intermittent once  traMADol 25milliGRAM(s) Oral every 6 hours PRN  oxyCODONE  5 mG/acetaminophen 325 mG 1Tablet(s) Oral every 4 hours PRN    Endocrine Medications:   insulin lispro (HumaLOG) corrective regimen sliding scale  SubCutaneous Before meals and at bedtime  dextrose Gel 1Dose(s) Oral once PRN  dextrose 50% Injectable 12.5Gram(s) IV Push once  dextrose 50% Injectable 25Gram(s) IV Push once  dextrose 50% Injectable 25Gram(s) IV Push once  glucagon  Injectable 1milliGRAM(s) IntraMuscular once PRN  dexamethasone     Tablet 2milliGRAM(s) Oral every 12 hours    All other standing medications:   pantoprazole    Tablet 40milliGRAM(s) Oral before breakfast  docusate sodium 100milliGRAM(s) Oral three times a day  senna 2Tablet(s) Oral at bedtime    All other PRN medications:      Vital Signs Last 24 Hrs  T(C): 36.7, Max: 37.3 (04-10 @ 05:06)  T(F): 98.1, Max: 99.2 (04-10 @ 05:06)  HR: 70 (60 - 70)  BP: 104/71 (92/62 - 104/71)  BP(mean): 77 (77 - 77)  RR: 16 (14 - 17)  SpO2: 99% (97% - 100%)      I & Os for current day (as of 04-10 @ 12:33)  =============================================  IN:    Oral Fluid: 640 ml    Total IN: 640 ml  ---------------------------------------------  OUT:    Voided: 1600 ml    Total OUT: 1600 ml  ---------------------------------------------  Total NET: -960 ml        PHYSICAL EXAM:    NAD. comfortable  no increased WOB. no stridor  bicoronal incision intact. staples in place. no dehiscence or erythema  head wrap replaced     LABS:  CBC-                        9.1    5.2   )-----------( 140      ( 09 Apr 2017 06:01 )             27.3     BMP/CMP-  09 Apr 2017 06:01    142    |  106    |  6      ----------------------------<  104    3.5     |  28     |  0.66     Ca    8.3        09 Apr 2017 06:01  Phos  3.4       09 Apr 2017 06:01  Mg     2.1       09 Apr 2017 06:01        Assessment/Plan:  36y Female s/p bifrontal craniectomy for tumor resection and cranioplasty on 4/6. POD 4  -care per NSGY  -neuro checks  -pain control  -continue keppra 500 BID  -continue daily head wraps, if pt d/c home then keep head wrap in place until Wed, if falls off at home then pt should replace it.  - scds          - d/w attending MD Wilfred Flower whom agrees with the above plan

## 2017-04-13 DIAGNOSIS — I95.81 POSTPROCEDURAL HYPOTENSION: ICD-10-CM

## 2017-04-13 DIAGNOSIS — D32.0 BENIGN NEOPLASM OF CEREBRAL MENINGES: ICD-10-CM

## 2017-04-13 LAB — SURGICAL PATHOLOGY STUDY: SIGNIFICANT CHANGE UP

## 2017-04-17 ENCOUNTER — APPOINTMENT (OUTPATIENT)
Dept: OTOLARYNGOLOGY | Facility: CLINIC | Age: 37
End: 2017-04-17

## 2017-04-17 ENCOUNTER — APPOINTMENT (OUTPATIENT)
Dept: NEUROSURGERY | Facility: CLINIC | Age: 37
End: 2017-04-17

## 2017-04-17 VITALS
TEMPERATURE: 98.2 F | SYSTOLIC BLOOD PRESSURE: 105 MMHG | WEIGHT: 119 LBS | HEART RATE: 63 BPM | BODY MASS INDEX: 18.04 KG/M2 | HEIGHT: 68 IN | DIASTOLIC BLOOD PRESSURE: 66 MMHG

## 2017-04-17 VITALS
HEART RATE: 66 BPM | BODY MASS INDEX: 18.04 KG/M2 | WEIGHT: 119 LBS | OXYGEN SATURATION: 97 % | HEIGHT: 68 IN | DIASTOLIC BLOOD PRESSURE: 64 MMHG | SYSTOLIC BLOOD PRESSURE: 97 MMHG | TEMPERATURE: 97.2 F

## 2017-04-17 DIAGNOSIS — M89.9 DISORDER OF BONE, UNSPECIFIED: ICD-10-CM

## 2017-05-15 ENCOUNTER — APPOINTMENT (OUTPATIENT)
Dept: OTOLARYNGOLOGY | Facility: CLINIC | Age: 37
End: 2017-05-15

## 2017-05-15 VITALS
HEIGHT: 68 IN | SYSTOLIC BLOOD PRESSURE: 107 MMHG | HEART RATE: 75 BPM | WEIGHT: 119 LBS | BODY MASS INDEX: 18.04 KG/M2 | DIASTOLIC BLOOD PRESSURE: 71 MMHG

## 2017-05-15 DIAGNOSIS — D32.9 BENIGN NEOPLASM OF MENINGES, UNSPECIFIED: ICD-10-CM

## 2017-05-15 DIAGNOSIS — J30.9 ALLERGIC RHINITIS, UNSPECIFIED: ICD-10-CM

## 2017-08-14 ENCOUNTER — APPOINTMENT (OUTPATIENT)
Dept: OTOLARYNGOLOGY | Facility: CLINIC | Age: 37
End: 2017-08-14

## 2017-08-31 ENCOUNTER — APPOINTMENT (OUTPATIENT)
Dept: OCCUPATIONAL MEDICINE | Age: 37
End: 2017-08-31
Attending: EMERGENCY MEDICINE

## 2017-09-25 ENCOUNTER — APPOINTMENT (OUTPATIENT)
Dept: OTOLARYNGOLOGY | Facility: CLINIC | Age: 37
End: 2017-09-25

## 2017-10-16 ENCOUNTER — APPOINTMENT (OUTPATIENT)
Dept: OTOLARYNGOLOGY | Facility: CLINIC | Age: 37
End: 2017-10-16

## 2018-01-16 NOTE — H&P ADULT - PROBLEM/PLAN-1
History of Present Illness:     Chief Complaint   Patient presents with    Sore Throat    Headache     on sunday but has gone away    Generalized Body Aches     Pt is a 36y.o. year old female    Presents to clinic for concerns for a virus. Feeling tired, body aches and sore throat. Sore throat started today. The fatigue and body aches has been for the past 2 days. No known sick contacts. No fever. Slight cough with no production. Drinking tea and chicken broth to help. Past Medical History:   Diagnosis Date    Bipolar 1 disorder ([facility] [address]     Calculus of kidney     Depression     MR (mental retardation)          Current Outpatient Prescriptions on File Prior to Visit   Medication Sig Dispense Refill    drospirenone-ethinyl estradiol (LORYNA, 28,) 3-0.02 mg tab Take 1 Tab by mouth daily. 3 Package 3    ARIPiprazole (ABILIFY) 15 mg tablet Take 15 mg by mouth daily.  topiramate (TOPAMAX) 100 mg tablet Take  by mouth daily.  buPROPion SR (WELLBUTRIN SR) 150 mg SR tablet Take 150 mg by mouth daily.  DIPHENHYDRAMINE HCL (BENADRYL ALLERGY PO) Take  by mouth. No current facility-administered medications on file prior to visit.           Allergies:  No Known Allergies      Review of Systems:  Denies fever, chills, sweats  Denies chest pain   + cough  Denies sputum production, SOB, pleuritic chest pain, wheezing    Objective:     Vitals:    01/16/18 1542   BP: 116/73   Pulse: 77   Resp: 16   Temp: 98 °F (36.7 °C)   TempSrc: Oral   SpO2: 97%   Weight: 189 lb 12.8 oz (86.1 kg)   Height: 5' 5\" (1.651 m)       Physical Exam:  General appearance - alert, well appearing, and in no distress  Ears - bilateral TM's and external ear canals normal  Nose - mucosal congestion and mucosal erythema  Mouth - mucous membranes moist, pharynx normal without lesions and erythematous  Neck - supple, no significant adenopathy  Chest - clear to auscultation, no wheezes, rales or rhonchi, symmetric air entry  Heart - normal rate, regular rhythm, normal S1, S2, no murmurs, rubs, clicks or gallops      Recent Labs:  Recent Results (from the past 12 hour(s))   AMB POC RAPID STREP A    Collection Time: 01/16/18  3:59 PM   Result Value Ref Range    VALID INTERNAL CONTROL POC Yes     Group A Strep Ag Negative Negative         Assessment and Plan:   Pt is a 36y.o. year old female,      ICD-10-CM ICD-9-CM    1. Viral URI J06.9 465.9     B97.89     2. Sore throat J02.9 462 AMB POC RAPID STREP A     Supportive care  Follow up PRN      Samia Lennon MD      I have discussed the diagnosis with the patient and the intended plan as seen in the above orders. The patient has received an after-visit summary and questions were answered concerning future plans. DISPLAY PLAN FREE TEXT

## 2018-05-23 NOTE — PRE-OP CHECKLIST - TEMPERATURE IN FAHRENHEIT (DEGREES F)
How Severe Are Your Spot(S)?: mild
Have Your Spot(S) Been Treated In The Past?: has not been treated
Hpi Title: Evaluation of Skin Lesions
98.2

## 2020-08-17 NOTE — PROGRESS NOTE ADULT - SUBJECTIVE AND OBJECTIVE BOX
Neurosurgery Post-Op Check    HPI: 36 y.o female without significant PMHx,was found to have a frontal tumor by MRI after patient noticed a bony protuberance in the forehead.  No neurological symptoms, no pain.  Patient presents for pre opertaive cerebral angiogram and possible endovascular embolization prior to next day surgical resection.     Subjective: Patient without any complaints of pain or headache. Nursing concerned for hypotension to 80s systolic. Patient relates she is at baseline in the 90s systolic    T(C): 37.1, Max: 37.1 (04-05 @ 10:30)  HR: 60 (60 - 73)  BP: 99/63 (80/57 - 99/63)  RR: 16 (16 - 16)  SpO2: 100% (95% - 100%)  Wt(kg): --    Exam: NAD, AAOx3.  PERRL. EOMI. VF intact.  Neck FROM, nontender  Lungs clear b/l  Heart S1, S2. NSR.  Abd soft, flat, NT/ND. +BS  Pulses 2+ throughout. Right groin C/D/I.  CNs II-XII intact. 5/5 str x4 extremities. Sensation to LT intact. Following commands.    CBC Full  -  ( 05 Apr 2017 07:12 )  WBC Count : 4.8 K/uL  Hemoglobin : 12.5 g/dL  Hematocrit : 36.9 %  Platelet Count - Automated : 152 K/uL  Mean Cell Volume : 83.5 fL  Mean Cell Hemoglobin : 28.3 pg  Mean Cell Hemoglobin Concentration : 33.9 g/dL      04-05    139  |  106  |  9   ----------------------------<  85  4.2   |  28  |  0.75    Ca    9.1      05 Apr 2017 07:12    TPro  7.2  /  Alb  3.8  /  TBili  0.6  /  DBili  x   /  AST  15  /  ALT  17  /  AlkPhos  68  04-05    PT/INR - ( 05 Apr 2017 07:12 )   PT: 12.1 sec;   INR: 1.09          PTT - ( 05 Apr 2017 07:12 )  PTT:32.8 sec        Assessment/Plan: 36 year old healthy female with frontal interosseous mass likely meningioma s/p cerebral angiogram with embolization of tumor via b/l middle meningeal and left superficial temporal arteries.    -Continue groin and neuro checks as ordered,  -500c IVF bolus given for asymptomatic hypotension,  -Tylenol PRN,  -Decadron 2q8h w/ PPI and ISS,  -Plan for OR tomorrow w/ Dr. Yarbrough for craniotomy and tumor resection,  -D/w Dr. Summers, Dr. Yarbrough [Negative] : Psychiatric [de-identified] : DM2

## 2021-01-22 PROBLEM — D32.9 BENIGN NEOPLASM OF MENINGES, UNSPECIFIED: Chronic | Status: ACTIVE | Noted: 2017-04-05

## 2021-02-08 ENCOUNTER — APPOINTMENT (OUTPATIENT)
Dept: SURGERY | Facility: CLINIC | Age: 41
End: 2021-02-08
Payer: COMMERCIAL

## 2021-02-08 VITALS
TEMPERATURE: 97.2 F | HEART RATE: 71 BPM | WEIGHT: 118 LBS | BODY MASS INDEX: 17.88 KG/M2 | SYSTOLIC BLOOD PRESSURE: 97 MMHG | DIASTOLIC BLOOD PRESSURE: 69 MMHG | HEIGHT: 68 IN | OXYGEN SATURATION: 99 %

## 2021-02-08 PROCEDURE — 99244 OFF/OP CNSLTJ NEW/EST MOD 40: CPT

## 2021-02-08 PROCEDURE — 99072 ADDL SUPL MATRL&STAF TM PHE: CPT

## 2021-02-08 NOTE — CONSULT LETTER
[FreeTextEntry1] : 2021\par \par \par \par Rodrigo Nathan M.D.\par [address] [facility]\[place] [address]\par Telephone #: [phone]\par \par \par Re: Lizzeth Sen\par : 1980\par \par \par Dear Dr. Nathan:\par \par I had the opportunity to see Ms. Sen today for evaluation and management of an incisional hernia.  She noticed a bulge of the  section incision after her most recent  in 2019.  She denied any pain or discomfort in the area.  She noted she has lost a little weight and the bulge has become more noticeable as a result.\par \par On physical examination, her height is 5 feet 8 inches, her weight is 118 pounds, and BMI 17.94.  Her temperature is 97.2 °F, blood pressure is 97/69, heart rate 71, and O2 saturation is 99% on room air.  In general, she is a well-dressed, well-nourished woman who appears her stated age and is in no acute distress.  She is calm, alert and oriented x 3 and calm.  HEENT exam demonstrates no scleral icterus and a normocephalic atraumatic appearance.  Neck is supple without JVD or cervical lymphadenopathy.  Lungs are clear to auscultation bilaterally.  Heart sounds S1S2 are normal with a regular rate and rhythm.  Her abdomen is soft, non-tender, and non-distended.  There is a small, reducible, non-tender umbilical hernia.  There is a well-healed Pfannenstiel incision without a definite incisional hernia appreciated on Valsalva maneuver.  Her extremities are warm and dry without clubbing, cyanosis or edema.  \par \par In summary, Ms. Sen is a 40-year-old woman with a possible incisional hernia.  We will perform an ultrasound of the abdomen to evaluate for a possible incisional hernia.  She was referred to plastic surgery for a possible combined procedure with an abdominoplasty.\par \par Thank you for the opportunity to care for this patient.  Please do not hesitate to contact me in the event that you have any questions or concerns about the care of this patient.\par \par Sincerely,\par \par \par \par Aruna Simi, M.D.\par \par CC:\par Sly Zarco M.D.\par [address]\[place] [address]\par Telephone #:  [phone]

## 2021-02-08 NOTE — HISTORY OF PRESENT ILLNESS
[de-identified] : Ms. Sen presented today for evaluation and management of an incisional hernia.  She noticed a bulge of the  section incision after her most recent  in 2019.  She denied any pain or discomfort in the area.  She noted she has lost a little weight and the bulge has become more noticeable as a result.

## 2021-02-08 NOTE — PHYSICAL EXAM
[Calm] : calm [de-identified] : NAD, comfortable [de-identified] : NCAT, no scleral icterus [de-identified] : Supple, no JVD or cervical lymphadenopathy [de-identified] : CTAB [de-identified] : S1S2 normal, RRR [de-identified] : +BS soft NT ND.  No hepatosplenomegaly.  There is a small, reducible, non-tender umbilical hernia.  There is a well-healed Pfannenstiel incision without a definite incisional hernia appreciated on Valsalva maneuver. [de-identified] : No clubbing, cyanosis, or edema. [de-identified] : Warm, dry. [de-identified] : A&Ox3

## 2021-02-08 NOTE — ASSESSMENT
[FreeTextEntry1] : Ms. Sen is a 40-year-old woman with a possible incisional hernia.  We will perform an ultrasound of the abdomen to evaluate for a possible incisional hernia.  She was referred to plastic surgery for a possible combined procedure with an abdominoplasty.

## 2021-02-09 ENCOUNTER — OUTPATIENT (OUTPATIENT)
Dept: OUTPATIENT SERVICES | Facility: HOSPITAL | Age: 41
LOS: 1 days | End: 2021-02-09
Payer: COMMERCIAL

## 2021-02-09 ENCOUNTER — APPOINTMENT (OUTPATIENT)
Dept: ULTRASOUND IMAGING | Facility: HOSPITAL | Age: 41
End: 2021-02-09
Payer: COMMERCIAL

## 2021-02-09 DIAGNOSIS — Z98.891 HISTORY OF UTERINE SCAR FROM PREVIOUS SURGERY: Chronic | ICD-10-CM

## 2021-02-09 PROCEDURE — 76705 ECHO EXAM OF ABDOMEN: CPT | Mod: 26

## 2021-02-09 PROCEDURE — 76705 ECHO EXAM OF ABDOMEN: CPT

## 2021-02-25 ENCOUNTER — RESULT REVIEW (OUTPATIENT)
Age: 41
End: 2021-02-25

## 2021-02-25 ENCOUNTER — APPOINTMENT (OUTPATIENT)
Dept: CT IMAGING | Facility: CLINIC | Age: 41
End: 2021-02-25
Payer: COMMERCIAL

## 2021-02-25 ENCOUNTER — OUTPATIENT (OUTPATIENT)
Dept: OUTPATIENT SERVICES | Facility: HOSPITAL | Age: 41
LOS: 1 days | End: 2021-02-25

## 2021-02-25 DIAGNOSIS — Z98.891 HISTORY OF UTERINE SCAR FROM PREVIOUS SURGERY: Chronic | ICD-10-CM

## 2021-02-25 PROCEDURE — 74177 CT ABD & PELVIS W/CONTRAST: CPT | Mod: 26

## 2021-03-30 ENCOUNTER — LABORATORY RESULT (OUTPATIENT)
Age: 41
End: 2021-03-30

## 2021-03-31 ENCOUNTER — TRANSCRIPTION ENCOUNTER (OUTPATIENT)
Age: 41
End: 2021-03-31

## 2021-04-01 ENCOUNTER — OUTPATIENT (OUTPATIENT)
Dept: OUTPATIENT SERVICES | Facility: HOSPITAL | Age: 41
LOS: 1 days | Discharge: HOME | End: 2021-04-01
Payer: COMMERCIAL

## 2021-04-01 ENCOUNTER — RESULT REVIEW (OUTPATIENT)
Age: 41
End: 2021-04-01

## 2021-04-01 DIAGNOSIS — Z98.891 HISTORY OF UTERINE SCAR FROM PREVIOUS SURGERY: Chronic | ICD-10-CM

## 2021-04-01 PROCEDURE — 49561: CPT

## 2021-04-01 PROCEDURE — 49568: CPT

## 2021-04-01 PROCEDURE — 88302 TISSUE EXAM BY PATHOLOGIST: CPT | Mod: 26

## 2021-04-02 ENCOUNTER — NON-APPOINTMENT (OUTPATIENT)
Age: 41
End: 2021-04-02

## 2021-04-06 LAB — SURGICAL PATHOLOGY STUDY: SIGNIFICANT CHANGE UP

## 2021-04-12 ENCOUNTER — APPOINTMENT (OUTPATIENT)
Dept: SURGERY | Facility: CLINIC | Age: 41
End: 2021-04-12
Payer: COMMERCIAL

## 2021-04-12 VITALS
SYSTOLIC BLOOD PRESSURE: 114 MMHG | OXYGEN SATURATION: 99 % | DIASTOLIC BLOOD PRESSURE: 79 MMHG | WEIGHT: 120 LBS | TEMPERATURE: 97.6 F | BODY MASS INDEX: 18.19 KG/M2 | HEART RATE: 73 BPM | HEIGHT: 68 IN

## 2021-04-12 DIAGNOSIS — Z78.9 OTHER SPECIFIED HEALTH STATUS: ICD-10-CM

## 2021-04-12 DIAGNOSIS — Z80.8 FAMILY HISTORY OF MALIGNANT NEOPLASM OF OTHER ORGANS OR SYSTEMS: ICD-10-CM

## 2021-04-12 DIAGNOSIS — K42.9 UMBILICAL HERNIA W/OUT OBSTRUCTION OR GANGRENE: ICD-10-CM

## 2021-04-12 DIAGNOSIS — K43.0 INCISIONAL HERNIA WITH OBSTRUCTION, W/OUT GANGRENE: ICD-10-CM

## 2021-04-12 DIAGNOSIS — B97.7 PAPILLOMAVIRUS AS THE CAUSE OF DISEASES CLASSIFIED ELSEWHERE: ICD-10-CM

## 2021-04-12 PROCEDURE — 99024 POSTOP FOLLOW-UP VISIT: CPT

## 2021-04-12 NOTE — ASSESSMENT
[FreeTextEntry1] : Ms. Sen is a 40-year-old woman who underwent an abdominoplasty, breast augmentation, and open incisional hernia repair with mesh on April 1, 2021.  She is recovering as expected and will follow up with me as needed.

## 2021-04-12 NOTE — HISTORY OF PRESENT ILLNESS
[de-identified] : Ms. Sen presented previously for evaluation and management of an incisional hernia.  She noticed a bulge of the  section incision after her most recent  in 2019.  She denied any pain or discomfort in the area.  She noted she has lost a little weight and the bulge has become more noticeable as a result.  She underwent an abdominoplasty, breast augmentation, and open incisional hernia repair with mesh on 2021. [de-identified] : She presented today for a routine post-operative visit.  She is overall feeling well.  She denied fever, chills, nausea, vomiting, diarrhea, or constipation.

## 2021-04-12 NOTE — REASON FOR VISIT
[Post Op: _________] : a [unfilled] post op visit [FreeTextEntry1] : She underwent an abdominoplasty, breast augmentation, and open incisional hernia repair with mesh on April 1, 2021.

## 2021-04-12 NOTE — DATA REVIEWED
[FreeTextEntry1] : US abdomen (2/9/2021) - no discrete abdominal wall hernia is visualized.  Consider CT if clinical suspicion persists.\par \par CT abdomen/pelvis (2/25/2021) - tiny fat-containing umbilical hernia.  Diastasis rectus in the infraumbilical region.  There is a left paramidline infraumbilical hernia (approximately 10 cm inferior to the umbilicus) present with the neck measuring 2.0 cm and the sac measuring 4.8 x 1.4 x 5.2 cm.\par \par Pathology (4/1/2021) - hernia sac and fibroadipose tissue.

## 2021-04-12 NOTE — PHYSICAL EXAM
[Calm] : calm [de-identified] : NAD, comfortable [de-identified] : NCAT, no scleral icterus [de-identified] : No clubbing, cyanosis, or edema. [de-identified] : soft NT ND.  Incisions healing well without erythema or induration.  No evidence of any recurrent incisional hernias on Valsalva maneuver.  JESSICA present in LLQ with serosanguinous output. [de-identified] : Warm, dry. [de-identified] : A&Ox3

## 2021-04-12 NOTE — CONSULT LETTER
[FreeTextEntry1] : 2021\par \par \par \par Rodrigo Nathan M.D.\par [address] [facility]\[place] [address]\par Telephone #: [phone]\par \par \par Re: Lizzeth Sen\par : 1980\par \par \par Dear Dr. Nathan:\par \par I had the opportunity to see Ms. Sen today for a routine post-operative visit after she underwent an abdominoplasty, breast augmentation, and open incisional hernia repair with mesh on 2021.  She is overall feeling well.  She denied fever, chills, nausea, vomiting, diarrhea, or constipation.\par \par On physical examination, her height is 5 feet 8 inches, her weight is 118 pounds, and BMI 17.94.  Her temperature is 97.2 °F, blood pressure is 97/69, heart rate 71, and O2 saturation is 99% on room air.  In general, she is a well-dressed, well-nourished woman who appears her stated age and is in no acute distress.  She is calm, alert and oriented x 3 and calm.  HEENT exam demonstrates no scleral icterus and a normocephalic atraumatic appearance.  Neck is supple without JVD or cervical lymphadenopathy.  Lungs are clear to auscultation bilaterally.  Heart sounds S1S2 are normal with a regular rate and rhythm.  Her abdomen is soft, non-tender, and non-distended.  The incisions are healing well without erythema or induration.  There is no evidence of any recurrent incisional hernias on Valsalva maneuver.  There is a JESSICA present in the left lower quadrant with serosanguinous output.  Her extremities are warm and dry without clubbing, cyanosis or edema.  \par \par I had previously reviewed the results of the ultrasound of the abdomen that was performed on 2021, which demonstrated no discrete abdominal wall hernia visualized.  Consider CT if clinical suspicion persists.\par \par I had previously reviewed the result of the CT abdomen/pelvis that was performed on 2021, which demonstrated a tiny fat-containing umbilical hernia.  Diastasis rectus in the infraumbilical region.  There is a left paramidline infraumbilical hernia (approximately 10 cm inferior to the umbilicus) present with the neck measuring 2.0 cm and the sac measuring 4.8 x 1.4 x 5.2 cm.\par \par I reviewed the pathology with the patient, which demonstrated hernia sac and fibroadipose tissue.\par \par In summary, Ms. Sen is a 40-year-old woman who underwent an abdominoplasty, breast augmentation, and open incisional hernia repair with mesh on 2021.  She is recovering as expected and will follow up with me as needed.\par \par Thank you for the opportunity to care for this patient.  Please do not hesitate to contact me in the event that you have any questions or concerns about the care of this patient.\par \par Sincerely,\par \par \par \par Aruna Belcher M.D.\par \par CC:\par Sly Zarco M.D.\[address]\[place] [address]\[facility] Telephone #:  [phone]\[facility] \par Jack Gillespie M.D., F.A.C.S.\[address], [address]\[place] [address]\[facility] Telephone #:  [phone]

## 2022-08-18 NOTE — DISCHARGE NOTE ADULT - VISION (WITH CORRECTIVE LENSES IF THE PATIENT USUALLY WEARS THEM):
Final Anesthesia Post-op Assessment    Patient: Michelle Parikh  Procedure(s) Performed: EXPLORATION OF LEFT LUMBAR FOUR-FIVE FUSION, HARDWARE REMOVAL AT LUMBAR FOUR- LUMBAR FIVE AND INSTRUMENTATION AT LUMBAR THREE-LUMBAR FOUR, LUMBAR FOUR-LUMBAR FIVE  Anesthesia type: General    Vitals Value Taken Time   Temp 36.3 °C (97.3 °F) 08/18/22 1634   Pulse 68 08/18/22 1634   Resp 15 08/18/22 1634   SpO2 97 % 08/18/22 1634   /61 08/18/22 1634         Patient Location: PACU Phase 1  Post-op Vital Signs:stable  Level of Consciousness: participates in exam and answers questions appropriately  Respiratory Status: spontaneous ventilation and nasal cannula (2L O2 per NC)  Cardiovascular blood pressure returned to baseline  Hydration: euvolemic  Pain Management: adequately controlled  Nausea: None  Airway Patency:patent  Post-op Assessment: awake, alert, appropriately conversant, or baseline  Comments: Will transfer to floor      No complications documented.   
Normal vision: sees adequately in most situations; can see medication labels, newsprint

## 2022-10-06 ENCOUNTER — APPOINTMENT (OUTPATIENT)
Dept: NEUROSURGERY | Facility: CLINIC | Age: 42
End: 2022-10-06
Payer: COMMERCIAL

## 2022-10-06 ENCOUNTER — NON-APPOINTMENT (OUTPATIENT)
Age: 42
End: 2022-10-06

## 2022-10-06 VITALS
OXYGEN SATURATION: 99 % | TEMPERATURE: 98 F | WEIGHT: 120 LBS | RESPIRATION RATE: 18 BRPM | HEART RATE: 74 BPM | DIASTOLIC BLOOD PRESSURE: 69 MMHG | SYSTOLIC BLOOD PRESSURE: 103 MMHG | BODY MASS INDEX: 18.19 KG/M2 | HEIGHT: 68 IN

## 2022-10-06 PROCEDURE — 99202 OFFICE O/P NEW SF 15 MIN: CPT

## 2022-10-06 NOTE — HISTORY OF PRESENT ILLNESS
[de-identified] : 41 year old female with history of WHO grade I meningioma resection 4/6/2017 presents for routine follow up. She has not been seen since 2017.\par Denies headache, nausea/vomiting, numbness/tingling/weakness in extremities, abnormal movement of extremities, gait/balance changes.\par

## 2022-10-06 NOTE — PHYSICAL EXAM
[General Appearance - Alert] : alert [General Appearance - In No Acute Distress] : in no acute distress [Person] : oriented to person [Place] : oriented to place [Time] : oriented to time [Cranial Nerves Optic (II)] : visual acuity intact bilaterally,  pupils equal round and reactive to light [Cranial Nerves Oculomotor (III)] : extraocular motion intact [Cranial Nerves Trigeminal (V)] : facial sensation intact symmetrically [Cranial Nerves Facial (VII)] : face symmetrical [Cranial Nerves Glossopharyngeal (IX)] : tongue and palate midline [Cranial Nerves Accessory (XI - Cranial And Spinal)] : head turning and shoulder shrug symmetric [Cranial Nerves Hypoglossal (XII)] : there was no tongue deviation with protrusion [Motor Tone] : muscle tone was normal in all four extremities [Motor Strength] : muscle strength was normal in all four extremities [Motor Handedness Right-Handed] : the patient is right hand dominant [Sensation Tactile Decrease] : light touch was intact [Abnormal Walk] : normal gait [Balance] : balance was intact

## 2022-10-06 NOTE — ASSESSMENT
[FreeTextEntry1] : Proceed with MRI brain with and without contrast for routine follow up of WHO grade I meningioma. Return to office to review after MRI complete. Patient verbalized understanding and agrees with plan.

## 2025-01-06 NOTE — OCCUPATIONAL THERAPY INITIAL EVALUATION ADULT - LEVEL OF INDEPENDENCE: SUPINE/SIT, REHAB EVAL
Please continue taking Macrobid for your urinary tract infection. You will be discharged with the dumont catheter today. Follow up with Urology as soon as possible for continued episodes of urinary retention. We re-tested your urine today and will contact you with results. Please return if you develop fever/chills, nausea, vomiting, or flank pain.   
independent